# Patient Record
Sex: FEMALE | Race: WHITE | NOT HISPANIC OR LATINO | Employment: FULL TIME | URBAN - METROPOLITAN AREA
[De-identification: names, ages, dates, MRNs, and addresses within clinical notes are randomized per-mention and may not be internally consistent; named-entity substitution may affect disease eponyms.]

---

## 2019-03-14 ENCOUNTER — OFFICE VISIT (OUTPATIENT)
Dept: FAMILY MEDICINE CLINIC | Facility: CLINIC | Age: 34
End: 2019-03-14

## 2019-03-14 VITALS
WEIGHT: 194 LBS | HEART RATE: 68 BPM | HEIGHT: 66 IN | DIASTOLIC BLOOD PRESSURE: 84 MMHG | OXYGEN SATURATION: 99 % | SYSTOLIC BLOOD PRESSURE: 120 MMHG | BODY MASS INDEX: 31.18 KG/M2

## 2019-03-14 DIAGNOSIS — M25.552 PAIN OF LEFT HIP JOINT: ICD-10-CM

## 2019-03-14 DIAGNOSIS — Z12.39 SCREENING FOR BREAST CANCER: ICD-10-CM

## 2019-03-14 DIAGNOSIS — Z13.1 SCREENING FOR DIABETES MELLITUS: ICD-10-CM

## 2019-03-14 DIAGNOSIS — Z13.0 SCREENING FOR DEFICIENCY ANEMIA: ICD-10-CM

## 2019-03-14 DIAGNOSIS — Z80.3 FAMILY HISTORY OF BREAST CANCER: ICD-10-CM

## 2019-03-14 DIAGNOSIS — Z00.00 ANNUAL PHYSICAL EXAM: Primary | ICD-10-CM

## 2019-03-14 DIAGNOSIS — Z13.220 SCREENING FOR HYPERLIPIDEMIA: ICD-10-CM

## 2019-03-14 DIAGNOSIS — Z23 NEED FOR VACCINATION: ICD-10-CM

## 2019-03-14 DIAGNOSIS — R53.82 CHRONIC FATIGUE: ICD-10-CM

## 2019-03-14 PROCEDURE — 90732 PPSV23 VACC 2 YRS+ SUBQ/IM: CPT | Performed by: FAMILY MEDICINE

## 2019-03-14 PROCEDURE — 90715 TDAP VACCINE 7 YRS/> IM: CPT | Performed by: FAMILY MEDICINE

## 2019-03-14 PROCEDURE — 99385 PREV VISIT NEW AGE 18-39: CPT | Performed by: FAMILY MEDICINE

## 2019-03-14 PROCEDURE — 90471 IMMUNIZATION ADMIN: CPT | Performed by: FAMILY MEDICINE

## 2019-03-14 PROCEDURE — 90472 IMMUNIZATION ADMIN EACH ADD: CPT | Performed by: FAMILY MEDICINE

## 2019-03-14 RX ORDER — ALBUTEROL SULFATE 90 UG/1
2 AEROSOL, METERED RESPIRATORY (INHALATION) EVERY 4 HOURS PRN
COMMUNITY
End: 2020-01-06 | Stop reason: SDUPTHER

## 2019-03-14 NOTE — PROGRESS NOTES
Subjective   Olga Szymanski is a 33 y.o. female. Patient is here today for   Chief Complaint   Patient presents with   • Establish Care   • Annual Exam     Physical          HPI    She is not fasting but she is able to return in a few weeks for labs.       Health Habits:  Dental Exam. up to date  Eye Exam. not up to date - Will schedule eye exam  Exercise: 5 times/week.  Current exercise activities include: running/ jogging  Wear seatbelt:Yes  Smoke detectors in the home:  Yes  Carbon monoxide detectors in the home: Yes  Guns in the home: Yes- Stored in a safe.  Feel safe with spouse/in the home: Yes  Mental Health concerns: pt endorses a hx of mild but chronic anxiety and depression. She plans to meet with a counselor available through her employer. She prefers to avoid Rx medications if possible.    STI screening: Sexually active with ; She declines STI screening today.   Vaccinations: She would like a TDAP booster and Pneumovax today.   Cancer Screening: Her PAP smear is UTD with her OB/GYN. She would like to get a baseline mammogram. She has a family hx of breast cancer/ovarian cancer in her maternal grandmother and breast cancer/colon cancer in her paternal grandmother.  She believes her grandmother developed breast cancer around age 45.  No BRBPR.   Sunscreen:No- She will schedule an appointment with her dermatologist.       Patient reports a hx of asthma. She experiences some flare-ups with running. She has an albuterol inhaler but does not use it because she does not like the taste.     Left sided leg pain:  Patient reports intermittent left posterior leg pain and left hip pain after exertion. She notes that she sleeps on her left side every night. No known injury or fall.       The following portions of the patient's history were reviewed and updated as appropriate: allergies, current medications, past family history, past medical history, past social history, past surgical history and problem  list.    Past Medical History:   Diagnosis Date   • Allergic    • Asthma       History reviewed. No pertinent surgical history.    Family History   Problem Relation Age of Onset   • Colon polyps Father    • Anxiety disorder Brother    • Depression Brother    • Ovarian cancer Maternal Grandmother    • Breast cancer Maternal Grandmother    • Heart disease Maternal Grandfather    • Breast cancer Paternal Grandmother    • Colon cancer Paternal Grandmother    • Alzheimer's disease Paternal Grandfather        No Known Allergies     Social History     Tobacco Use   • Smoking status: Current Some Day Smoker     Types: Cigarettes   • Smokeless tobacco: Never Used   • Tobacco comment: maybe 0.5 pack per week   Substance and Sexual Activity   • Alcohol use: Yes     Comment: about 6 drinks per week    • Drug use: No   • Sexual activity: Yes     Partners: Male     Birth control/protection: None     Comment: trying to conceive           Social History Narrative    Lives at home with her  and their dog.  Works as a  at [employer].        Outpatient Medications Prior to Visit   Medication Sig Dispense Refill   • albuterol sulfate  (90 Base) MCG/ACT inhaler Inhale 2 puffs Every 4 (Four) Hours As Needed.       Review of Systems   Constitutional: Positive for fatigue. Negative for activity change, appetite change and unexpected weight change.   HENT: Negative for congestion, ear pain, sinus pain and tinnitus.    Eyes: Negative for pain and visual disturbance.   Respiratory: Negative for cough, chest tightness, shortness of breath and wheezing.    Cardiovascular: Negative for chest pain and palpitations.   Gastrointestinal: Negative for abdominal pain, constipation, diarrhea, nausea and vomiting.   Genitourinary: Negative for dysuria, hematuria and menstrual problem.   Musculoskeletal: Positive for arthralgias and myalgias. Negative for back pain and gait problem.        Left hip and left leg pain   Skin:  Negative for rash and wound.   Allergic/Immunologic: Positive for environmental allergies. Negative for food allergies.   Neurological: Negative for dizziness, weakness, numbness and headaches.   Psychiatric/Behavioral: Positive for sleep disturbance. Negative for dysphoric mood. The patient is not nervous/anxious.          Objective       Vitals:    03/14/19 0903   BP: 120/84   Pulse: 68   SpO2: 99%       Physical Exam   Constitutional: She appears well-developed and well-nourished.   HENT:   Head: Normocephalic and atraumatic.   Right Ear: Tympanic membrane and ear canal normal.   Left Ear: Tympanic membrane and ear canal normal.   Mouth/Throat: Oropharynx is clear and moist.   Eyes: EOM are normal. Pupils are equal, round, and reactive to light.   Neck: Normal range of motion. Neck supple. No thyromegaly present.   Cardiovascular: Normal rate and regular rhythm. Exam reveals no gallop and no friction rub.   No murmur heard.  Pulmonary/Chest: Effort normal and breath sounds normal. No respiratory distress. She has no wheezes. She has no rales.   Abdominal: Bowel sounds are normal. She exhibits no distension. There is no hepatosplenomegaly. There is no tenderness. There is no rigidity, no rebound and no guarding.   Musculoskeletal: Normal range of motion.        Right hip: Normal. She exhibits normal range of motion and no tenderness.        Left hip: Normal. She exhibits normal range of motion, normal strength, no tenderness, no bony tenderness, no crepitus and no deformity.   No pain with internal or external rotation bilaterally.    Lymphadenopathy:     She has no cervical adenopathy.   Neurological: She has normal strength. Gait normal.   Skin: Skin is warm and dry.   Psychiatric: Her speech is normal. Her mood appears anxious.   Vitals reviewed.      ASSESSMENT/PLAN             Visit Diagnoses     Annual physical exam    -  Primary    Relevant Orders    Mammo Screening Bilateral With CAD, plan for baseline  imaging now, at age 33, since pt's grandmother developed breast cancer around age 45    Comprehensive metabolic panel (Completed)    Hemoglobin A1c (Completed)    Lipid panel (Completed)    CBC No Differential (Completed)    TSH (Completed)    Tdap Vaccine Greater Than or Equal To 8yo IM (Completed)    Pneumococcal Polysaccharide Vaccine 23-Valent (PPSV23) Greater Than or Equal To 1yo Subcutaneous / IM (Completed)      Screening for diabetes mellitus        Relevant Orders    Comprehensive metabolic panel (Completed)    Hemoglobin A1c (Completed)    Screening for hyperlipidemia        Relevant Orders    Lipid panel (Completed)    Screening for deficiency anemia        Relevant Orders    CBC No Differential (Completed)    Need for vaccination        Relevant Orders    Tdap Vaccine Greater Than or Equal To 8yo IM (Completed)    Pneumococcal Polysaccharide Vaccine 23-Valent (PPSV23) Greater Than or Equal To 1yo Subcutaneous / IM (Completed)      Screening for breast cancer        Relevant Orders    Mammo Screening Bilateral With CAD    Family history of breast cancer        Relevant Orders    Mammo Screening Bilateral With CAD    Chronic fatigue        Relevant Orders    Comprehensive metabolic panel (Completed)    Hemoglobin A1c (Completed)    CBC No Differential (Completed)  Pt briefly counseled on sleep hygiene.        Pain of left hip joint      Normal physical exam  Pt declines referral to PT at this time  Pt advised to try OTC Tylenol prn and stretching            Patient Instructions   Schedule a nurse visit for fasting labs. Our lab is open Mon-Fri from 8am-4:30pm.     Please schedule an eye exam and follow up with your dermatologist soon.      some sleepy time tea with chamomile to help you sleep.     Insomnia  Insomnia is a sleep disorder that makes it difficult to fall asleep or to stay asleep. Insomnia can cause tiredness (fatigue), low energy, difficulty concentrating, mood swings, and poor  performance at work or school.  There are three different ways to classify insomnia:  · Difficulty falling asleep.  · Difficulty staying asleep.  · Waking up too early in the morning.    Any type of insomnia can be long-term (chronic) or short-term (acute). Both are common. Short-term insomnia usually lasts for three months or less. Chronic insomnia occurs at least three times a week for longer than three months.  What are the causes?  Insomnia may be caused by another condition, situation, or substance, such as:  · Anxiety.  · Certain medicines.  · Gastroesophageal reflux disease (GERD) or other gastrointestinal conditions.  · Asthma or other breathing conditions.  · Restless legs syndrome, sleep apnea, or other sleep disorders.  · Chronic pain.  · Menopause. This may include hot flashes.  · Stroke.  · Abuse of alcohol, tobacco, or illegal drugs.  · Depression.  · Caffeine.  · Neurological disorders, such as Alzheimer disease.  · An overactive thyroid (hyperthyroidism).    The cause of insomnia may not be known.  What increases the risk?  Risk factors for insomnia include:  · Gender. Women are more commonly affected than men.  · Age. Insomnia is more common as you get older.  · Stress. This may involve your professional or personal life.  · Income. Insomnia is more common in people with lower income.  · Lack of exercise.  · Irregular work schedule or night shifts.  · Traveling between different time zones.    What are the signs or symptoms?  If you have insomnia, trouble falling asleep or trouble staying asleep is the main symptom. This may lead to other symptoms, such as:  · Feeling fatigued.  · Feeling nervous about going to sleep.  · Not feeling rested in the morning.  · Having trouble concentrating.  · Feeling irritable, anxious, or depressed.    How is this treated?  Treatment for insomnia depends on the cause. If your insomnia is caused by an underlying condition, treatment will focus on addressing the  condition. Treatment may also include:  · Medicines to help you sleep.  · Counseling or therapy.  · Lifestyle adjustments.    Follow these instructions at home:  · Take medicines only as directed by your health care provider.  · Keep regular sleeping and waking hours. Avoid naps.  · Keep a sleep diary to help you and your health care provider figure out what could be causing your insomnia. Include:  ? When you sleep.  ? When you wake up during the night.  ? How well you sleep.  ? How rested you feel the next day.  ? Any side effects of medicines you are taking.  ? What you eat and drink.  · Make your bedroom a comfortable place where it is easy to fall asleep:  ? Put up shades or special blackout curtains to block light from outside.  ? Use a white noise machine to block noise.  ? Keep the temperature cool.  · Exercise regularly as directed by your health care provider. Avoid exercising right before bedtime.  · Use relaxation techniques to manage stress. Ask your health care provider to suggest some techniques that may work well for you. These may include:  ? Breathing exercises.  ? Routines to release muscle tension.  ? Visualizing peaceful scenes.  · Cut back on alcohol, caffeinated beverages, and cigarettes, especially close to bedtime. These can disrupt your sleep.  · Do not overeat or eat spicy foods right before bedtime. This can lead to digestive discomfort that can make it hard for you to sleep.  · Limit screen use before bedtime. This includes:  ? Watching TV.  ? Using your smartphone, tablet, and computer.  · Stick to a routine. This can help you fall asleep faster. Try to do a quiet activity, brush your teeth, and go to bed at the same time each night.  · Get out of bed if you are still awake after 15 minutes of trying to sleep. Keep the lights down, but try reading or doing a quiet activity. When you feel sleepy, go back to bed.  · Make sure that you drive carefully. Avoid driving if you feel very  sleepy.  · Keep all follow-up appointments as directed by your health care provider. This is important.  Contact a health care provider if:  · You are tired throughout the day or have trouble in your daily routine due to sleepiness.  · You continue to have sleep problems or your sleep problems get worse.  Get help right away if:  · You have serious thoughts about hurting yourself or someone else.  This information is not intended to replace advice given to you by your health care provider. Make sure you discuss any questions you have with your health care provider.  Document Released: 12/15/2001 Document Revised: 05/19/2017 Document Reviewed: 09/18/2015  [employer] Interactive Patient Education © 2018 [employer]        Return in about 1 year (around 3/14/2020).    I,Alexandra Farley MD, am scribing for, and in the presence of,Alexandra Farley MD. 3/14/2019 11:19 AM    I, Alexandra Farley MD   personally, performed the services described in this documentation, as scribed by,Alexandra Farley MD, in my presence, and it is both accurate and complete.    Alexandra Farley MD   03/31/19  4:37 PM

## 2019-03-14 NOTE — PATIENT INSTRUCTIONS
Schedule a nurse visit for fasting labs. Our lab is open Mon-Fri from 8am-4:30pm.     Please schedule an eye exam and follow up with your dermatologist soon.      some sleepy time tea with chamomile to help you sleep.     Insomnia  Insomnia is a sleep disorder that makes it difficult to fall asleep or to stay asleep. Insomnia can cause tiredness (fatigue), low energy, difficulty concentrating, mood swings, and poor performance at work or school.  There are three different ways to classify insomnia:  · Difficulty falling asleep.  · Difficulty staying asleep.  · Waking up too early in the morning.    Any type of insomnia can be long-term (chronic) or short-term (acute). Both are common. Short-term insomnia usually lasts for three months or less. Chronic insomnia occurs at least three times a week for longer than three months.  What are the causes?  Insomnia may be caused by another condition, situation, or substance, such as:  · Anxiety.  · Certain medicines.  · Gastroesophageal reflux disease (GERD) or other gastrointestinal conditions.  · Asthma or other breathing conditions.  · Restless legs syndrome, sleep apnea, or other sleep disorders.  · Chronic pain.  · Menopause. This may include hot flashes.  · Stroke.  · Abuse of alcohol, tobacco, or illegal drugs.  · Depression.  · Caffeine.  · Neurological disorders, such as Alzheimer disease.  · An overactive thyroid (hyperthyroidism).    The cause of insomnia may not be known.  What increases the risk?  Risk factors for insomnia include:  · Gender. Women are more commonly affected than men.  · Age. Insomnia is more common as you get older.  · Stress. This may involve your professional or personal life.  · Income. Insomnia is more common in people with lower income.  · Lack of exercise.  · Irregular work schedule or night shifts.  · Traveling between different time zones.    What are the signs or symptoms?  If you have insomnia, trouble falling asleep or trouble  staying asleep is the main symptom. This may lead to other symptoms, such as:  · Feeling fatigued.  · Feeling nervous about going to sleep.  · Not feeling rested in the morning.  · Having trouble concentrating.  · Feeling irritable, anxious, or depressed.    How is this treated?  Treatment for insomnia depends on the cause. If your insomnia is caused by an underlying condition, treatment will focus on addressing the condition. Treatment may also include:  · Medicines to help you sleep.  · Counseling or therapy.  · Lifestyle adjustments.    Follow these instructions at home:  · Take medicines only as directed by your health care provider.  · Keep regular sleeping and waking hours. Avoid naps.  · Keep a sleep diary to help you and your health care provider figure out what could be causing your insomnia. Include:  ? When you sleep.  ? When you wake up during the night.  ? How well you sleep.  ? How rested you feel the next day.  ? Any side effects of medicines you are taking.  ? What you eat and drink.  · Make your bedroom a comfortable place where it is easy to fall asleep:  ? Put up shades or special blackout curtains to block light from outside.  ? Use a white noise machine to block noise.  ? Keep the temperature cool.  · Exercise regularly as directed by your health care provider. Avoid exercising right before bedtime.  · Use relaxation techniques to manage stress. Ask your health care provider to suggest some techniques that may work well for you. These may include:  ? Breathing exercises.  ? Routines to release muscle tension.  ? Visualizing peaceful scenes.  · Cut back on alcohol, caffeinated beverages, and cigarettes, especially close to bedtime. These can disrupt your sleep.  · Do not overeat or eat spicy foods right before bedtime. This can lead to digestive discomfort that can make it hard for you to sleep.  · Limit screen use before bedtime. This includes:  ? Watching TV.  ? Using your smartphone, tablet,  and computer.  · Stick to a routine. This can help you fall asleep faster. Try to do a quiet activity, brush your teeth, and go to bed at the same time each night.  · Get out of bed if you are still awake after 15 minutes of trying to sleep. Keep the lights down, but try reading or doing a quiet activity. When you feel sleepy, go back to bed.  · Make sure that you drive carefully. Avoid driving if you feel very sleepy.  · Keep all follow-up appointments as directed by your health care provider. This is important.  Contact a health care provider if:  · You are tired throughout the day or have trouble in your daily routine due to sleepiness.  · You continue to have sleep problems or your sleep problems get worse.  Get help right away if:  · You have serious thoughts about hurting yourself or someone else.  This information is not intended to replace advice given to you by your health care provider. Make sure you discuss any questions you have with your health care provider.  Document Released: 12/15/2001 Document Revised: 05/19/2017 Document Reviewed: 09/18/2015  [employer] Interactive Patient Education © 2018 [employer]

## 2019-03-15 ENCOUNTER — TRANSCRIBE ORDERS (OUTPATIENT)
Dept: ADMINISTRATIVE | Facility: HOSPITAL | Age: 34
End: 2019-03-15

## 2019-03-15 DIAGNOSIS — Z12.31 VISIT FOR SCREENING MAMMOGRAM: Primary | ICD-10-CM

## 2019-03-21 ENCOUNTER — LAB (OUTPATIENT)
Dept: FAMILY MEDICINE CLINIC | Facility: CLINIC | Age: 34
End: 2019-03-21

## 2019-03-21 DIAGNOSIS — Z13.0 SCREENING FOR DEFICIENCY ANEMIA: ICD-10-CM

## 2019-03-21 DIAGNOSIS — Z13.1 SCREENING FOR DIABETES MELLITUS: ICD-10-CM

## 2019-03-21 DIAGNOSIS — R53.82 CHRONIC FATIGUE: ICD-10-CM

## 2019-03-21 DIAGNOSIS — Z00.00 ANNUAL PHYSICAL EXAM: ICD-10-CM

## 2019-03-21 DIAGNOSIS — Z13.220 SCREENING FOR HYPERLIPIDEMIA: ICD-10-CM

## 2019-03-22 LAB
ALBUMIN SERPL-MCNC: 4.5 G/DL (ref 3.5–5.2)
ALBUMIN/GLOB SERPL: 1.7 G/DL
ALP SERPL-CCNC: 45 U/L (ref 39–117)
ALT SERPL-CCNC: 17 U/L (ref 1–33)
AST SERPL-CCNC: 17 U/L (ref 1–32)
BILIRUB SERPL-MCNC: 0.6 MG/DL (ref 0.2–1.2)
BUN SERPL-MCNC: 11 MG/DL (ref 6–20)
BUN/CREAT SERPL: 13.4 (ref 7–25)
CALCIUM SERPL-MCNC: 9.5 MG/DL (ref 8.6–10.5)
CHLORIDE SERPL-SCNC: 99 MMOL/L (ref 98–107)
CHOLEST SERPL-MCNC: 161 MG/DL (ref 0–200)
CO2 SERPL-SCNC: 21.2 MMOL/L (ref 22–29)
CREAT SERPL-MCNC: 0.82 MG/DL (ref 0.57–1)
ERYTHROCYTE [DISTWIDTH] IN BLOOD BY AUTOMATED COUNT: 11.7 % (ref 12.3–15.4)
GLOBULIN SER CALC-MCNC: 2.6 GM/DL
GLUCOSE SERPL-MCNC: 84 MG/DL (ref 65–99)
HBA1C MFR BLD: 5.03 % (ref 4.8–5.6)
HCT VFR BLD AUTO: 48.5 % (ref 34–46.6)
HDLC SERPL-MCNC: 63 MG/DL (ref 40–60)
HGB BLD-MCNC: 15.7 G/DL (ref 12–15.9)
LDLC SERPL CALC-MCNC: 81 MG/DL (ref 0–100)
MCH RBC QN AUTO: 32.5 PG (ref 26.6–33)
MCHC RBC AUTO-ENTMCNC: 32.4 G/DL (ref 31.5–35.7)
MCV RBC AUTO: 100.4 FL (ref 79–97)
PLATELET # BLD AUTO: 292 10*3/MM3 (ref 140–450)
POTASSIUM SERPL-SCNC: 4.4 MMOL/L (ref 3.5–5.2)
PROT SERPL-MCNC: 7.1 G/DL (ref 6–8.5)
RBC # BLD AUTO: 4.83 10*6/MM3 (ref 3.77–5.28)
SODIUM SERPL-SCNC: 137 MMOL/L (ref 136–145)
TRIGL SERPL-MCNC: 87 MG/DL (ref 0–150)
TSH SERPL DL<=0.005 MIU/L-ACNC: 2.51 MIU/ML (ref 0.27–4.2)
VLDLC SERPL CALC-MCNC: 17.4 MG/DL (ref 5–40)
WBC # BLD AUTO: 5.5 10*3/MM3 (ref 3.4–10.8)

## 2019-03-28 DIAGNOSIS — D75.89 MACROCYTOSIS: Primary | ICD-10-CM

## 2019-03-29 LAB
FOLATE SERPL-MCNC: 9.64 NG/ML (ref 4.78–24.2)
VIT B12 SERPL-MCNC: 472 PG/ML (ref 211–946)

## 2019-04-12 ENCOUNTER — APPOINTMENT (OUTPATIENT)
Dept: MAMMOGRAPHY | Facility: HOSPITAL | Age: 34
End: 2019-04-12

## 2019-05-07 ENCOUNTER — HOSPITAL ENCOUNTER (OUTPATIENT)
Dept: MAMMOGRAPHY | Facility: HOSPITAL | Age: 34
Discharge: HOME | End: 2019-05-07
Admitting: FAMILY MEDICINE

## 2019-05-07 DIAGNOSIS — Z12.31 VISIT FOR SCREENING MAMMOGRAM: ICD-10-CM

## 2019-05-07 PROCEDURE — 77063 BREAST TOMOSYNTHESIS BI: CPT

## 2019-05-07 PROCEDURE — 77067 SCR MAMMO BI INCL CAD: CPT

## 2019-09-11 ENCOUNTER — OFFICE VISIT (OUTPATIENT)
Dept: FAMILY MEDICINE CLINIC | Facility: CLINIC | Age: 34
End: 2019-09-11

## 2019-09-11 VITALS
DIASTOLIC BLOOD PRESSURE: 74 MMHG | WEIGHT: 191 LBS | BODY MASS INDEX: 30.7 KG/M2 | RESPIRATION RATE: 16 BRPM | HEART RATE: 66 BPM | OXYGEN SATURATION: 99 % | SYSTOLIC BLOOD PRESSURE: 116 MMHG | HEIGHT: 66 IN

## 2019-09-11 DIAGNOSIS — R07.9 CHEST PAIN, UNSPECIFIED TYPE: ICD-10-CM

## 2019-09-11 DIAGNOSIS — M25.552 LEFT HIP PAIN: Primary | ICD-10-CM

## 2019-09-11 PROCEDURE — 99214 OFFICE O/P EST MOD 30 MIN: CPT | Performed by: NURSE PRACTITIONER

## 2019-09-11 PROCEDURE — 93000 ELECTROCARDIOGRAM COMPLETE: CPT | Performed by: NURSE PRACTITIONER

## 2019-09-11 RX ORDER — MULTIPLE VITAMINS W/ MINERALS TAB 9MG-400MCG
1 TAB ORAL DAILY
COMMUNITY
End: 2021-11-09

## 2019-09-11 NOTE — PROGRESS NOTES
Subjective   Olga Szymanski is a 33 y.o. female.     History of Present Illness   Pt is here for chest, left upper abdominal quadrant and L middle back pain . Pt states Sx have been present for 6 months.They are intermittent and the pain is stabbing it can last up to 2 hrs  Has not taken any otc meds for pain.     Has started a boot camp and thinks it is a muscle strain                              The following portions of the patient's history were reviewed and updated as appropriate: allergies, current medications, past family history, past medical history, past social history, past surgical history and problem list.    Review of Systems   Constitutional: Negative for activity change and appetite change.   Respiratory: Negative for cough and shortness of breath.    Cardiovascular: Positive for chest pain. Negative for palpitations and leg swelling.   Gastrointestinal: Positive for abdominal pain. Negative for blood in stool, constipation and diarrhea.   Neurological: Negative for dizziness and headaches.       Objective   Physical Exam   Constitutional: She appears well-developed and well-nourished. No distress.   HENT:   Head: Normocephalic and atraumatic.   Right Ear: External ear normal.   Left Ear: External ear normal.   Eyes: EOM are normal.   Neck: Neck supple. No thyromegaly present.   Cardiovascular: Normal rate, regular rhythm and normal heart sounds.   Pulmonary/Chest: Effort normal and breath sounds normal.   Musculoskeletal: Normal range of motion.   Neurological: She is alert.   Skin: Skin is warm.   Nursing note and vitals reviewed.    ECG 12 Lead  Date/Time: 9/12/2019 9:45 AM  Performed by: Azra Moraes APRN  Authorized by: Azra Moraes APRN   Comparison: not compared with previous ECG   Rhythm: sinus rhythm and sinus tachycardia  Rate: bradycardic  Conduction: conduction normal  QRS axis: normal  Other: no other findings    Clinical impression: normal ECG            Assessment/Plan    Olga was seen today for chest pain and abdominal pain.    Diagnoses and all orders for this visit:    Left hip pain  -     Ambulatory Referral to Physical Therapy Evaluate and treat  -     ECG 12 Lead    Chest pain, unspecified type  -     ECG 12 Lead    Other orders  -     ECG 12 Lead

## 2019-09-11 NOTE — PATIENT INSTRUCTIONS
Hip Pain    The hip is the joint between the upper legs and the lower pelvis. The bones, cartilage, tendons, and muscles of your hip joint support your body and allow you to move around.  Hip pain can range from a minor ache to severe pain in one or both of your hips. The pain may be felt on the inside of the hip joint near the groin, or the outside near the buttocks and upper thigh. You may also have swelling or stiffness.  Follow these instructions at home:  Managing pain, stiffness, and swelling  · If directed, apply ice to the injured area.  ? Put ice in a plastic bag.  ? Place a towel between your skin and the bag.  ? Leave the ice on for 20 minutes, 2-3 times a day  · Sleep with a pillow between your legs on your most comfortable side.  · Avoid any activities that cause pain.  General instructions  · Take over-the-counter and prescription medicines only as told by your health care provider.  · Do any exercises as told by your health care provider.  · Record the following:  ? How often you have hip pain.  ? The location of your pain.  ? What the pain feels like.  ? What makes the pain worse.  · Keep all follow-up visits as told by your health care provider. This is important.  Contact a health care provider if:  · You cannot put weight on your leg.  · Your pain or swelling continues or gets worse after one week.  · It gets harder to walk.  · You have a fever.  Get help right away if:  · You fall.  · You have a sudden increase in pain and swelling in your hip.  · Your hip is red or swollen or very tender to touch.  Summary  · Hip pain can range from a minor ache to severe pain in one or both of your hips.  · The pain may be felt on the inside of the hip joint near the groin, or the outside near the buttocks and upper thigh.  · Avoid any activities that cause pain.  · Record how often you have hip pain, the location of the pain, what makes it worse and what it feels like.  This information is not intended to  replace advice given to you by your health care provider. Make sure you discuss any questions you have with your health care provider.  Document Released: 06/07/2011 Document Revised: 11/20/2017 Document Reviewed: 11/20/2017  [employer] Interactive Patient Education © 2019 [employer]

## 2019-10-01 ENCOUNTER — HOSPITAL ENCOUNTER (OUTPATIENT)
Dept: PHYSICAL THERAPY | Facility: HOSPITAL | Age: 34
Setting detail: THERAPIES SERIES
Discharge: HOME | End: 2019-10-01

## 2019-10-01 DIAGNOSIS — M25.552 LEFT HIP PAIN: Primary | ICD-10-CM

## 2019-10-01 DIAGNOSIS — R29.898 LEFT LEG WEAKNESS: ICD-10-CM

## 2019-10-01 PROCEDURE — 97110 THERAPEUTIC EXERCISES: CPT

## 2019-10-01 PROCEDURE — 97161 PT EVAL LOW COMPLEX 20 MIN: CPT

## 2019-10-01 NOTE — THERAPY EVALUATION
Outpatient Physical Therapy Ortho Initial Evaluation  [facility]     Patient Name: Olga Szymanski  : 1985  MRN: [id number]  Today's Date: 10/1/2019      Visit Date: 10/01/2019    Patient Active Problem List   Diagnosis   • Allergic   • Asthma        Past Medical History:   Diagnosis Date   • Allergic    • Asthma    • Depression    • HPV in female 2006    followed by Gynecology        History reviewed. No pertinent surgical history.    Visit Dx:     ICD-10-CM ICD-9-CM   1. Left hip pain M25.552 719.45   2. Left leg weakness R29.898 729.89         Patient History     Row Name 10/01/19 1400             History    Brief Description of Current Complaint  Pt reports history of L hip pain ongoing x3 years which has been worsening. Pt has never had treatment for this previously and denies imaging to date. Pt established care with PCP due to new onset of burning/pain in L shoudler with radiation to chest. Had EKG done and per PCP likely pulled muscle vs cardiac issue. Pt participates in bootcamp 4-5x/week and does not feel irritation with any particular exercise. Pt suspects sciatica and gets sharp pain in heel with certain movements. Pt reports stiffness with prolonged position as well as difficulty sleeping on the L side.   -CN      Patient/Caregiver Goals  Relieve pain;Return to prior level of function;Know what to do to help the symptoms  -CN      Hand Dominance  left-handed  -CN         Pain     Pain Location  Hip  -CN      Pain at Present  1  -CN      Pain at Best  0  -CN      Pain at Worst  5  -CN      Pain Frequency  Intermittent  -CN      Pain Description  Shooting  -CN      What Performance Factors Make the Current Problem(s) WORSE?  sleeping on L side, sitting for extended period of time  -CN      What Performance Factors Make the Current Problem(s) BETTER?  Stretching, working out, Advil  -CN      Is your sleep disturbed?  No  -CN      Difficulties at work?  Yes, either standing/walking most  of the day or sitting most of the day; stiffness with both  -CN      Difficulties with recreational activities?  Yes, bootcamp 4-5x/week  -CN         Fall Risk Assessment    Any falls in the past year:  No  -CN         Services    Are you currently receiving Home Health services  No  -CN         Daily Activities    Primary Language  English  -CN      Are you able to read  Yes  -CN      Are you able to write  Yes  -CN      How does patient learn best?  Listening;Reading  -CN      Barriers to learning  None  -CN      Pt Participated in POC and Goals  Yes  -CN         Safety    Are you being hurt, hit, or frightened by anyone at home or in your life?  No  -CN      Are you being neglected by a caregiver  No  -CN        User Key  (r) = Recorded By, (t) = Taken By, (c) = Cosigned By    Initials Name Provider Type    CN Bela Khoury, PT Physical Therapist          PT Ortho     Row Name 10/01/19 1400       Posture/Observations    Alignment Options  Iliac crests  -CN    Iliac crests  Right:;Mild;Elevated  -CN       Sensory Screen for Light Touch- Lower Quarter Clearing    L1 (inguinal area)  Bilateral:;Intact  -CN    L2 (anterior mid thigh)  Bilateral:;Intact  -CN    L3 (distal anterior thigh)  Bilateral:;Intact  -CN    L4 (medial lower leg/foot)  Bilateral:;Intact  -CN    L5 (lateral lower leg/great toe)  Right:;Intact;Left:;Diminished  -CN       Myotomal Screen- Lower Quarter Clearing    Hip flexion (L2)  Right:;4 (Good);Left:;4- (Good -)  -CN    Knee extension (L3)  Right:;4+ (Good +);Left:;4 (Good)  -CN    Ankle DF (L4)  Right:;4+ (Good +);Left:;4 (Good)  -CN    Ankle PF (S1)  Bilateral:;4+ (Good +)  -CN    Knee flexion (S2)  Right:;4+ (Good +);Left:;4- (Good -)  -CN       Lumbar ROM Screen- Lower Quarter Clearing    Lumbar Flexion  Normal  -CN    Lumbar Extension  Normal  -CN    Lumbar Lateral Flexion  Normal  -CN    Lumbar Rotation  Normal  -CN       Hip/Thigh Palpation    Piriformis   Left:;Tender;Guarded/taut;Trigger point  -CN       Hip Special Tests    JANIE (hip vs SI pathology)  Left:;Negative  -CN       MMT (Manual Muscle Testing)    General MMT Comments  Hip abduction: R=4/5, L=4-/5; Hip extension: B=4-/5  -CN       Lower Extremity Flexibility    Hamstrings  Bilateral:;Mildly limited  -CN      User Key  (r) = Recorded By, (t) = Taken By, (c) = Cosigned By    Initials Name Provider Type    eBla Barbosa, PT Physical Therapist                      Therapy Education  Education Details: Anatomy, activity modifications, goals of PT, eval findings  Given: HEP, Symptoms/condition management, Pain management, Posture/body mechanics  Program: New  How Provided: Verbal, Demonstration, Written  Provided to: Patient  Level of Understanding: Teach back education performed, Verbalized, Demonstrated     PT OP Goals     Row Name 10/01/19 1500          PT Short Term Goals    STG Date to Achieve  10/22/19  -CN     STG 1  Pt will report pain rated 2/10 at worst in order to demonstrate ability to return to normalized ADLs and functional activities.  -CN     STG 1 Progress  New  -CN     STG 2  Pt will be independent with initial HEP for symptom management.  -CN     STG 2 Progress  New  -CN        Long Term Goals    LTG Date to Achieve  11/12/19  -CN     LTG 1  Pt will be independent and compliant with advanced HEP for long term management of symptoms and prevention of future occurrence.   -CN     LTG 1 Progress  New  -CN     LTG 2  Pt will be able to demonstrate hip abd and extension strength of 4/5 B in order to improve stability with recreational activities.   -CN     LTG 2 Progress  New  -CN     LTG 3  Pt will be able to return to bootcamp activities with minimal reports of pain and good form in order to decrease changes of future recurrence of symptoms.   -CN     LTG 3 Progress  New  -CN        Time Calculation    PT Goal Re-Cert Due Date  11/01/19  -CN       User Key  (r) = Recorded By, (t) =  Taken By, (c) = Cosigned By    Initials Name Provider Type    CN Bela Khoury, PT Physical Therapist          PT Assessment/Plan     Row Name 10/01/19 1976          PT Assessment    Functional Limitations  Limitations in functional capacity and performance;Performance in leisure activities;Performance in work activities  -CN     Impairments  Impaired flexibility;Muscle strength;Sensation;Pain;Posture  -CN     Assessment Comments  33 y.o. female referred to outpatient physical therapy for evaluation and treatment of left hip pain ongoing for several years.  Patient presents with tenderness to palpation in piriformis and QL, decreased hip abd/ext strength, dec core stability, dermatomal deficits in L L5, dec HS strength and impaired HS flexibility. Pt's signs and symptoms are consistent with piriformis syndrome and core/hip instability.  Pertinent comorbidities and personal factors that may affect progress include, but are not limited to, L shoulder pain which is worsening as hip progresses.  Pt scored 75/80 on the LEFS where 80/80 is no disability and is in stable clinical condition. Pt would benefit from skilled PT to address functional deficits and return to PLOF.   -CN     Please refer to paper survey for additional self-reported information  Yes  -CN     Rehab Potential  Good  -CN     Patient/caregiver participated in establishment of treatment plan and goals  Yes  -CN     Patient would benefit from skilled therapy intervention  Yes  -CN        PT Plan    PT Frequency  2x/week  -CN     Predicted Duration of Therapy Intervention (Therapy Eval)  6 weeks  -CN     Planned CPT's?  PT EVAL LOW COMPLEXITY: 95220;PT RE-EVAL: 77968;PT THER PROC EA 15 MIN: 96848;PT THER ACT EA 15 MIN: 30804;PT MANUAL THERAPY EA 15 MIN: 26448;PT NEUROMUSC RE-EDUCATION EA 15 MIN: 40635;PT HOT OR COLD PACK TREAT MCARE;PT ELECTRICAL STIM UNATTEND: ;PT ULTRASOUND EA 15 MIN: 98330;PT TRACTION LUMBAR: 24490  -CN     PT Plan  Comments  PT to treat 2x/week for 6 weeks consisting of flexibility, core/hip strengthening and stability exercises. Consider recumbent bike warm up, stm to L hip tissues, SL clamshells (if painfree), glute sets with progression to bridges, prone hip extension, resisted sidesteping and quadruped activites next visit.   -CN       User Key  (r) = Recorded By, (t) = Taken By, (c) = Cosigned By    Initials Name Provider Type    Bela Barbosa, MARIPOSA Physical Therapist            OP Exercises     Row Name 10/01/19 1500             Exercise 1    Exercise Name 1  Piriformis stretch (reach through)  -CN      Cueing 1  Verbal  -CN      Reps 1  3  -CN      Time 1  20 sec  -CN         Exercise 2    Exercise Name 2  90/90 HS stretch with ankle pump  -CN      Cueing 2  Verbal;Demo  -CN      Reps 2  10  -CN      Time 2  10 pumps  -CN         Exercise 3    Exercise Name 3  LTR  -CN      Cueing 3  Verbal  -CN      Reps 3  10  -CN      Time 3  5 sec  -CN         Exercise 4    Exercise Name 4  PPT  -CN      Cueing 4  Verbal  -CN      Reps 4  10  -CN      Time 4  5 sec  -CN        User Key  (r) = Recorded By, (t) = Taken By, (c) = Cosigned By    Initials Name Provider Type    Bela Barbosa PT Physical Therapist                        Outcome Measure Options: Lower Extremity Functional Scale (LEFS)(75/80)         Time Calculation:     Start Time: 1430  Stop Time: 1515  Time Calculation (min): 45 min     Therapy Charges for Today     Code Description Service Date Service Provider Modifiers Qty    88022710203  PT THER PROC EA 15 MIN 10/1/2019 Bela Khoury, PT GP 1    [id number]  PT EVAL LOW COMPLEXITY 2 10/1/2019 Bela Khoury, PT GP 1          PT G-Codes  Outcome Measure Options: Lower Extremity Functional Scale (LEFS)(75/80)         Bela Khoury PT  10/1/2019

## 2019-10-14 ENCOUNTER — HOSPITAL ENCOUNTER (OUTPATIENT)
Dept: PHYSICAL THERAPY | Facility: HOSPITAL | Age: 34
Setting detail: THERAPIES SERIES
Discharge: HOME | End: 2019-10-14

## 2019-10-14 DIAGNOSIS — M25.552 LEFT HIP PAIN: Primary | ICD-10-CM

## 2019-10-14 DIAGNOSIS — R29.898 LEFT LEG WEAKNESS: ICD-10-CM

## 2019-10-14 PROCEDURE — 97110 THERAPEUTIC EXERCISES: CPT

## 2019-10-14 PROCEDURE — 97140 MANUAL THERAPY 1/> REGIONS: CPT

## 2019-10-14 NOTE — THERAPY TREATMENT NOTE
Outpatient Physical Therapy Ortho Treatment Note  [facility]     Patient Name: Olga Szymanski  : 1985  MRN: [id number]  Today's Date: 10/14/2019      Visit Date: 10/14/2019    Visit Dx:    ICD-10-CM ICD-9-CM   1. Left hip pain M25.552 719.45   2. Left leg weakness R29.898 729.89       Patient Active Problem List   Diagnosis   • Allergic   • Asthma        Past Medical History:   Diagnosis Date   • Allergic    • Asthma    • Depression    • HPV in female     followed by Gynecology        No past surgical history on file.                    PT Assessment/Plan     Row Name 10/14/19 1555          PT Assessment    Assessment Comments  Pt returns for initial follow up after evaluation and reports reduced pain levels overall. Pt reports feelings of weakness in L hip after performing hip stretches likely due to instability and added several  hip strengthening exercises today. Pt also with symptoms down leg after performing initial HEP and corrected form with 90/90 HS stretch with ankle pump. Instructed on performance of self hip release with tennis ball at home.   -CN        PT Plan    PT Plan Comments  Assess response to added exercises and manual techniques and consider updating HEP. Add resisted sidestepping and prone hip extensor exercises next visit.   -CN       User Key  (r) = Recorded By, (t) = Taken By, (c) = Cosigned By    Initials Name Provider Type    Bela Barbosa, PT Physical Therapist            OP Exercises     Row Name 10/14/19 1500             Subjective Comments    Subjective Comments  The pain is actually a little better. I feel like that hip is weaker though and I have been getting these twitches down my leg after I do the exercises.   -CN         Subjective Pain    Able to rate subjective pain?  yes  -CN      Pre-Treatment Pain Level  3  -CN         Total Minutes    00803 - PT Therapeutic Exercise Minutes  30  -CN      16902 - PT Manual Therapy Minutes  10  -CN          Exercise 1    Exercise Name 1  Piriformis stretch (reach through)  -CN      Cueing 1  Verbal  -CN      Reps 1  3  -CN      Time 1  20 sec  -CN         Exercise 2    Exercise Name 2  90/90 HS stretch with ankle pump  -CN      Cueing 2  Verbal;Demo  -CN      Reps 2  10  -CN      Time 2  10 pumps  -CN         Exercise 3    Exercise Name 3  LTR  -CN      Cueing 3  Verbal  -CN      Reps 3  10  -CN      Time 3  5 sec  -CN         Exercise 4    Exercise Name 4  PPT  -CN      Cueing 4  Verbal  -CN      Reps 4  10  -CN      Time 4  5 sec  -CN         Exercise 5    Exercise Name 5  Recumbent bike  -CN      Time 5  5 min  -CN         Exercise 6    Exercise Name 6  GS  -CN      Cueing 6  Verbal  -CN      Reps 6  15  -CN      Time 6  5 sec  -CN         Exercise 7    Exercise Name 7  Bridges with PPT and GS  -CN      Cueing 7  Verbal  -CN      Reps 7  10  -CN         Exercise 8    Exercise Name 8  SL clamshells  -CN      Cueing 8  Verbal  -CN      Reps 8  10 B  -CN      Additional Comments  Pt reports pain wrapping around hip on the L  -CN         Exercise 9    Exercise Name 9  Prone hip extension over pillow  -CN      Cueing 9  Demo  -CN      Reps 9  10 B  -CN        User Key  (r) = Recorded By, (t) = Taken By, (c) = Cosigned By    Initials Name Provider Type    Bela Barbosa, MARIPOSA Physical Therapist                      Manual Rx (last 36 hours)      Manual Treatments     Row Name 10/14/19 1500             Total Minutes    80319 - PT Manual Therapy Minutes  10  -CN         Manual Rx 1    Manual Rx 1 Location  stm to L glute/piriformis tissues; pt in R SLing with pillow between knees  -CN        User Key  (r) = Recorded By, (t) = Taken By, (c) = Cosigned By    Initials Name Provider Type    Bela Barbosa, MARIPOSA Physical Therapist          PT OP Goals     Row Name 10/14/19 1600          PT Short Term Goals    STG Date to Achieve  10/22/19  -CN     STG 1  Pt will report pain rated 2/10 at worst in order to  demonstrate ability to return to normalized ADLs and functional activities.  -CN     STG 1 Progress  Ongoing  -CN     STG 1 Progress Comments  Pt reports pain rated 3/10 today.   -CN     STG 2  Pt will be independent with initial HEP for symptom management.  -CN     STG 2 Progress  Ongoing  -CN        Long Term Goals    LTG Date to Achieve  11/12/19  -CN     LTG 1  Pt will be independent and compliant with advanced HEP for long term management of symptoms and prevention of future occurrence.   -CN     LTG 1 Progress  Ongoing  -CN     LTG 2  Pt will be able to demonstrate hip abd and extension strength of 4/5 B in order to improve stability with recreational activities.   -CN     LTG 2 Progress  Ongoing  -CN     LTG 3  Pt will be able to return to bootcamp activities with minimal reports of pain and good form in order to decrease changes of future recurrence of symptoms.   -CN     LTG 3 Progress  Ongoing  -CN       User Key  (r) = Recorded By, (t) = Taken By, (c) = Cosigned By    Initials Name Provider Type    Bela Barbosa, PT Physical Therapist          Therapy Education  Given: HEP, Symptoms/condition management, Pain management, Posture/body mechanics  Program: Progressed  How Provided: Verbal, Demonstration  Provided to: Patient  Level of Understanding: Teach back education performed, Verbalized, Demonstrated              Time Calculation:   Start Time: 1520  Stop Time: 1600  Time Calculation (min): 40 min  Therapy Charges for Today     Code Description Service Date Service Provider Modifiers Qty    25324979166  PT THER PROC EA 15 MIN 10/14/2019 Bela Khoury, PT GP 2    [id number]  PT MANUAL THERAPY EA 15 MIN 10/14/2019 Bela Khoury, PT GP 1                    Bela Khoury PT  10/14/2019

## 2019-10-16 ENCOUNTER — HOSPITAL ENCOUNTER (OUTPATIENT)
Dept: PHYSICAL THERAPY | Facility: HOSPITAL | Age: 34
Setting detail: THERAPIES SERIES
Discharge: HOME | End: 2019-10-16

## 2019-10-16 DIAGNOSIS — M25.552 LEFT HIP PAIN: Primary | ICD-10-CM

## 2019-10-16 DIAGNOSIS — R29.898 LEFT LEG WEAKNESS: ICD-10-CM

## 2019-10-16 PROCEDURE — 97110 THERAPEUTIC EXERCISES: CPT

## 2019-10-16 NOTE — THERAPY TREATMENT NOTE
Outpatient Physical Therapy Ortho Treatment Note  [facility]     Patient Name: Olga Szymanski  : 1985  MRN: [id number]  Today's Date: 10/16/2019      Visit Date: 10/16/2019    Visit Dx:    ICD-10-CM ICD-9-CM   1. Left hip pain M25.552 719.45   2. Left leg weakness R29.898 729.89       Patient Active Problem List   Diagnosis   • Allergic   • Asthma        Past Medical History:   Diagnosis Date   • Allergic    • Asthma    • Depression    • HPV in female     followed by Gynecology        No past surgical history on file.                    PT Assessment/Plan     Row Name 10/16/19 1500          PT Assessment    Assessment Comments  Pt reports increased pain after last visit in groin/medial HS, however went to bootcamp class immediately following PT session. Discussed holding on bootcamp during this time as unsure if flare up in symptoms attributed to PT regimen vs bootcamp activities. Pt agreeable to plan and able to progress exercises today with good form.   -CN        PT Plan    PT Plan Comments  Assess response to added exercises and consider planks and seated D1 extension on swiss ball next visit.   -CN       User Key  (r) = Recorded By, (t) = Taken By, (c) = Cosigned By    Initials Name Provider Type    Bela Barbosa, PT Physical Therapist            OP Exercises     Row Name 10/16/19 1400             Subjective Comments    Subjective Comments  It was sore after last time. I have started to notice that the inside of my thigh is sore though.   -CN         Subjective Pain    Able to rate subjective pain?  yes  -CN      Pre-Treatment Pain Level  3  -CN         Total Minutes    04754 - PT Therapeutic Exercise Minutes  40  -CN         Exercise 1    Exercise Name 1  Piriformis stretch (reach through)  -CN      Cueing 1  Verbal  -CN      Reps 1  3  -CN      Time 1  20 sec  -CN         Exercise 2    Exercise Name 2  90/90 HS stretch with ankle pump  -CN      Cueing 2  Verbal;Demo  -CN       Reps 2  10  -CN      Time 2  10 pumps  -CN         Exercise 3    Exercise Name 3  LTR  -CN      Cueing 3  Verbal  -CN      Reps 3  10  -CN      Time 3  5 sec  -CN         Exercise 4    Exercise Name 4  PPT with addution  -CN      Cueing 4  Verbal  -CN      Reps 4  10  -CN      Time 4  5 sec  -CN         Exercise 5    Exercise Name 5  Recumbent bike  -CN      Time 5  5 min  -CN         Exercise 6    Exercise Name 6  GS  -CN      Cueing 6  Verbal  -CN      Reps 6  15  -CN      Time 6  5 sec  -CN         Exercise 7    Exercise Name 7  Bridges with PPT and GS  -CN      Cueing 7  Verbal  -CN      Sets 7  2  -CN      Reps 7  10  -CN         Exercise 8    Exercise Name 8  SL clamshells, pillow between knees  -CN      Cueing 8  Verbal  -CN      Reps 8  10 B  -CN         Exercise 9    Exercise Name 9  Prone hip extension over pillow  -CN      Cueing 9  Demo  -CN      Reps 9  10 B  -CN         Exercise 10    Exercise Name 10  Supine green swiss ball roll ins with TA  -CN      Cueing 10  Verbal  -CN      Reps 10  10  -CN         Exercise 11    Exercise Name 11  Tall kneeling with PPT and slight recline and HA  -CN      Cueing 11  Demo  -CN      Reps 11  10  -CN      Additional Comments  RTB  -CN         Exercise 12    Exercise Name 12  Quadruped alt LE lift with bar on back  -CN      Cueing 12  Demo  -CN      Reps 12  10  -CN        User Key  (r) = Recorded By, (t) = Taken By, (c) = Cosigned By    Initials Name Provider Type    Bela Barbosa, PT Physical Therapist                           Therapy Education  Given: HEP, Symptoms/condition management, Pain management, Posture/body mechanics  Program: Progressed  How Provided: Verbal, Demonstration  Provided to: Patient  Level of Understanding: Teach back education performed, Verbalized, Demonstrated              Time Calculation:   Start Time: 1418  Stop Time: 1458  Time Calculation (min): 40 min  Therapy Charges for Today     Code Description Service Date  Service Provider Modifiers Qty    [id number] HC PT THER PROC EA 15 MIN 10/16/2019 Bela Khoury, PT GP 3                    Bela Khoury, PT  10/16/2019

## 2019-10-21 ENCOUNTER — HOSPITAL ENCOUNTER (OUTPATIENT)
Dept: PHYSICAL THERAPY | Facility: HOSPITAL | Age: 34
Setting detail: THERAPIES SERIES
Discharge: HOME | End: 2019-10-21

## 2019-10-21 DIAGNOSIS — R29.898 LEFT LEG WEAKNESS: ICD-10-CM

## 2019-10-21 DIAGNOSIS — M25.552 LEFT HIP PAIN: Primary | ICD-10-CM

## 2019-10-21 PROCEDURE — 97110 THERAPEUTIC EXERCISES: CPT

## 2019-10-21 NOTE — THERAPY TREATMENT NOTE
Outpatient Physical Therapy Ortho Treatment Note  [facility]     Patient Name: Olga Szymanski  : 1985  MRN: [id number]  Today's Date: 10/21/2019      Visit Date: 10/21/2019    Visit Dx:    ICD-10-CM ICD-9-CM   1. Left hip pain M25.552 719.45   2. Left leg weakness R29.898 729.89       Patient Active Problem List   Diagnosis   • Allergic   • Asthma        Past Medical History:   Diagnosis Date   • Allergic    • Asthma    • Depression    • HPV in female     followed by Gynecology        No past surgical history on file.                    PT Assessment/Plan     Row Name 10/21/19 1514          PT Assessment    Assessment Comments  Pt reporting inc pain in L low back, L buttocks area over weekend after 2 2 mile hikes. She continue to hold boot camp classes but report limited change in overall condition. Lengthy discussion today discussing need for rest, adequate time to reduce inflammation, allow stabilization to occur as well as the difference between strength and stability. Focused on core/LE stability with more TA activation and less PPT as pt tends to over rotate pelvis with upper torso compensation rather than engage core. Pt appeared to understand goals today, she was able to demonstrate appropriate TA stabilization in standing and on swiss ball. Plan to hold boot camp for next month and allow re-stabilization training, then progress aerobic activities to prepare for hiking trip at end of December.   -LB        PT Plan    PT Plan Comments  Assess tolerance to last session. Consider planks, D1 extension on swiss ball, superman alternating.  -LB       User Key  (r) = Recorded By, (t) = Taken By, (c) = Cosigned By    Initials Name Provider Type    Brianna Chua PT Physical Therapist            OP Exercises     Row Name 10/21/19 1300             Subjective Comments    Subjective Comments  I went hiking over the weekend and I really was hurting. I felt like if I took one wrong step I was  going to be unable to walk. It was almost catching.  -LB         Subjective Pain    Able to rate subjective pain?  yes  -LB      Pre-Treatment Pain Level  0  -LB      Subjective Pain Comment  I don't feel anything today.  -LB         Total Minutes    93490 - PT Therapeutic Exercise Minutes  44  -LB         Exercise 1    Exercise Name 1  Piriformis stretch (reach through)  -LB      Cueing 1  Verbal  -LB      Reps 1  3  -LB      Time 1  20 sec  -LB         Exercise 2    Exercise Name 2  90/90 HS stretch with ankle pump  -LB      Cueing 2  Verbal;Demo  -LB      Reps 2  10  -LB      Time 2  10 pumps  -LB         Exercise 4    Exercise Name 4  DKTC with green ball with TA  -LB      Cueing 4  Verbal  -LB      Reps 4  10  -LB      Time 4  10  -LB      Additional Comments  encouraged elevator to raise pelvic floor  -LB         Exercise 5    Exercise Name 5  Recumbent bike  -LB      Time 5  5 min  -LB         Exercise 6    Exercise Name 6  --  -LB      Cueing 6  --  -LB      Reps 6  --  -LB      Time 6  --  -LB         Exercise 7    Exercise Name 7  bridges with GS  -LB      Cueing 7  Verbal  -LB      Sets 7  2  -LB      Reps 7  10  -LB      Additional Comments  on green ball  -LB         Exercise 8    Exercise Name 8  --  -LB      Cueing 8  --  -LB      Reps 8  --  -LB         Exercise 9    Exercise Name 9  --  -LB      Cueing 9  --  -LB      Reps 9  --  -LB         Exercise 10    Exercise Name 10  seated ball marches on blue ball  -LB      Cueing 10  --  -LB      Sets 10  2  -LB      Reps 10  10  -LB      Additional Comments  alternating w/ slight lumbar extension to engage core  -LB         Exercise 11    Exercise Name 11  --  -LB      Cueing 11  --  -LB      Reps 11  --  -LB         Exercise 12    Exercise Name 12  --  -LB      Cueing 12  --  -LB      Reps 12  --  -LB         Exercise 13    Exercise Name 13  BOSU step ups (black side up) with RLE    -LB      Reps 13  15  -LB      Time 13  3  -LB      Additional  Comments  no UE assist; cuing for TA  -LB         Exercise 14    Exercise Name 14  lateral walkout TS  -LB      Reps 14  4 laps  -LB      Additional Comments  40# with TA  -LB         Exercise 15    Exercise Name 15  seated #2 ball circles seated on blue ball  -LB      Sets 15  2  -LB      Reps 15  10  -LB      Additional Comments  cuing for TA  -LB         Exercise 16    Exercise Name 16  SL deadlift with #2 ball  -LB      Reps 16  10  -LB      Additional Comments  cuing for glute squeeze to return to stand  -LB         Exercise 17    Exercise Name 17  lateral/monster walk  -LB      Sets 17  2  -LB      Reps 17  20  -LB      Additional Comments  each; GTB  -LB         Exercise 18    Exercise Name 18  straight arm pulldown with TA TS  -LB      Reps 18  10  -LB      Additional Comments  30#   -LB        User Key  (r) = Recorded By, (t) = Taken By, (c) = Cosigned By    Initials Name Provider Type    Brianna Chua, PT Physical Therapist                       PT OP Goals     Row Name 10/21/19 1500          PT Short Term Goals    STG Date to Achieve  10/22/19  -LB     STG 1  Pt will report pain rated 2/10 at worst in order to demonstrate ability to return to normalized ADLs and functional activities.  -LB     STG 1 Progress  Ongoing  -LB     STG 2  Pt will be independent with initial HEP for symptom management.  -LB     STG 2 Progress  Ongoing  -LB        Long Term Goals    LTG Date to Achieve  11/12/19  -LB     LTG 1  Pt will be independent and compliant with advanced HEP for long term management of symptoms and prevention of future occurrence.   -LB     LTG 1 Progress  Ongoing  -LB     LTG 2  Pt will be able to demonstrate hip abd and extension strength of 4/5 B in order to improve stability with recreational activities.   -LB     LTG 2 Progress  Ongoing  -LB     LTG 3  Pt will be able to return to bootcamp activities with minimal reports of pain and good form in order to decrease changes of future recurrence of  symptoms.   -LB     LTG 3 Progress  Ongoing  -LB       User Key  (r) = Recorded By, (t) = Taken By, (c) = Cosigned By    Initials Name Provider Type    Brianna Chua, PT Physical Therapist          Therapy Education  Education Details: added stability ball exercises to program, encouraged holding boot camp, discussed stability vs. strength, need to reduce aggravating activities.  Given: Symptoms/condition management, HEP  Program: Progressed  How Provided: Verbal, Demonstration, Written  Provided to: Patient  Level of Understanding: Demonstrated, Verbalized, Teach back education performed              Time Calculation:   Start Time: 1300  Stop Time: 1345  Time Calculation (min): 45 min  Total Timed Code Minutes- PT: 44 minute(s)  Therapy Charges for Today     Code Description Service Date Service Provider Modifiers Qty    05506445563 HC PT THER PROC EA 15 MIN 10/21/2019 Brianna Gross, PT GP 3                    Brianna Gross PT  10/21/2019

## 2019-10-23 ENCOUNTER — HOSPITAL ENCOUNTER (OUTPATIENT)
Dept: PHYSICAL THERAPY | Facility: HOSPITAL | Age: 34
Setting detail: THERAPIES SERIES
Discharge: HOME | End: 2019-10-23

## 2019-10-23 DIAGNOSIS — M25.552 LEFT HIP PAIN: ICD-10-CM

## 2019-10-23 DIAGNOSIS — R29.898 LEFT LEG WEAKNESS: Primary | ICD-10-CM

## 2019-10-23 PROCEDURE — 97110 THERAPEUTIC EXERCISES: CPT

## 2019-10-23 NOTE — THERAPY TREATMENT NOTE
Outpatient Physical Therapy Ortho Treatment Note  [facility]     Patient Name: Olga Szymanski  : 1985  MRN: [id number]  Today's Date: 10/23/2019      Visit Date: 10/23/2019    Visit Dx:    ICD-10-CM ICD-9-CM   1. Left leg weakness R29.898 729.89   2. Left hip pain M25.552 719.45       Patient Active Problem List   Diagnosis   • Allergic   • Asthma        Past Medical History:   Diagnosis Date   • Allergic    • Asthma    • Depression    • HPV in female     followed by Gynecology        No past surgical history on file.                    PT Assessment/Plan     Row Name 10/23/19 1802          PT Assessment    Assessment Comments  Pt reports good tolerance to last session. She did have pain with walking dog yesterday and had to walk back home once pain started. Continued core/hip stability exercises. Possible multifidus irritation and dec strength as pt has inc pain with rotational movements.   -LB        PT Plan    PT Plan Comments  Continue core/hip stabilization. consider planks, controlled mountain climber.  -LB       User Key  (r) = Recorded By, (t) = Taken By, (c) = Cosigned By    Initials Name Provider Type    LB Brianna Gross, PT Physical Therapist            OP Exercises     Row Name 10/23/19 1600             Subjective Comments    Subjective Comments  I did walk my dog yesterday and i stopped when it started hurting but I did have to walk back.  -LB         Subjective Pain    Able to rate subjective pain?  yes  -LB      Pre-Treatment Pain Level  1  -LB         Total Minutes    65713 - PT Therapeutic Exercise Minutes  40  -LB         Exercise 1    Exercise Name 1  Piriformis stretch (reach through)  -LB      Cueing 1  Verbal  -LB      Reps 1  3  -LB      Time 1  20 sec  -LB         Exercise 2    Exercise Name 2  90/90 HS stretch with ankle pump  -LB      Cueing 2  Verbal;Demo  -LB      Reps 2  10  -LB      Time 2  10 pumps  -LB         Exercise 3    Exercise Name 3  seated D1 flexion  TS  -LB      Sets 3  2  -LB      Reps 3  10  -LB      Additional Comments  B; 20# on blue ball  -LB         Exercise 4    Exercise Name 4  DKTC with green ball with TA  -LB      Cueing 4  Verbal  -LB      Reps 4  10  -LB      Time 4  10  -LB         Exercise 5    Exercise Name 5  Recumbent bike  -LB      Time 5  5 min  -LB         Exercise 6    Exercise Name 6  prone hip extension  -LB      Reps 6  10  -LB      Additional Comments  each; cuing for glute set  -LB         Exercise 7    Exercise Name 7  bridges with GS  -LB      Cueing 7  Verbal  -LB      Sets 7  2  -LB      Reps 7  10  -LB      Additional Comments  on green ball  -LB         Exercise 10    Exercise Name 10  seated ball marches on blue ball  -LB      Sets 10  2  -LB      Reps 10  10  -LB      Additional Comments  alternating w/ slight lumbar extension to engage core  -LB         Exercise 12    Exercise Name 12  Qped bird/dog  -LB      Reps 12  10  -LB         Exercise 13    Exercise Name 13  BOSU step ups (black side up) with RLE    -LB      Reps 13  15  -LB      Time 13  3  -LB      Additional Comments  no UE assist; cuing for TA  -LB         Exercise 14    Exercise Name 14  lat pull seated on blue ball  -LB      Sets 14  2  -LB      Reps 14  10  -LB      Additional Comments  TS 40#  -LB         Exercise 15    Exercise Name 15  seated #2 ball circles seated on blue ball  -LB      Sets 15  2  -LB      Reps 15  10  -LB      Additional Comments  cuing for TA  -LB         Exercise 16    Exercise Name 16  --  -LB      Reps 16  --  -LB         Exercise 17    Exercise Name 17  TS row in partial squat  -LB      Sets 17  2  -LB      Reps 17  10  -LB      Additional Comments  30#; cuing for glute/TA  -LB         Exercise 18    Exercise Name 18  --  -LB      Reps 18  --  -LB         Exercise 19    Exercise Name 19  SLS on blue foam  -LB      Sets 19  2  -LB      Reps 19  15  -LB      Additional Comments  row; B  -LB        User Key  (r) = Recorded By, (t)  = Taken By, (c) = Cosigned By    Initials Name Provider Type    Brianna Chua, PT Physical Therapist                       PT OP Goals     Row Name 10/23/19 1800          PT Short Term Goals    STG Date to Achieve  10/22/19  -LB     STG 1  Pt will report pain rated 2/10 at worst in order to demonstrate ability to return to normalized ADLs and functional activities.  -LB     STG 1 Progress  Ongoing  -LB     STG 2  Pt will be independent with initial HEP for symptom management.  -LB     STG 2 Progress  Met  -LB        Long Term Goals    LTG Date to Achieve  11/12/19  -LB     LTG 1  Pt will be independent and compliant with advanced HEP for long term management of symptoms and prevention of future occurrence.   -LB     LTG 1 Progress  Ongoing  -LB     LTG 1 Progress Comments  Continued to progress.  -LB     LTG 2  Pt will be able to demonstrate hip abd and extension strength of 4/5 B in order to improve stability with recreational activities.   -LB     LTG 2 Progress  Ongoing  -LB     LTG 3  Pt will be able to return to bootcamp activities with minimal reports of pain and good form in order to decrease changes of future recurrence of symptoms.   -LB     LTG 3 Progress  Ongoing  -LB       User Key  (r) = Recorded By, (t) = Taken By, (c) = Cosigned By    Initials Name Provider Type    Brianna Chua PT Physical Therapist          Therapy Education  Education Details: reviewed HEP, discussed walking 1/2 distance on walk to allow for return home  Given: Symptoms/condition management, HEP  Program: Reinforced  How Provided: Verbal, Demonstration  Provided to: Patient  Level of Understanding: Teach back education performed, Demonstrated, Verbalized              Time Calculation:   Start Time: 1615  Stop Time: 1700  Time Calculation (min): 45 min  Total Timed Code Minutes- PT: 43 minute(s)  Therapy Charges for Today     Code Description Service Date Service Provider Modifiers Qty    23513477215 HC PT THER PROC EA 15 MIN  10/23/2019 Brianna Gross, PT GP 3                    Brianna Gross, PT  10/23/2019

## 2019-10-28 ENCOUNTER — HOSPITAL ENCOUNTER (OUTPATIENT)
Dept: PHYSICAL THERAPY | Facility: HOSPITAL | Age: 34
Setting detail: THERAPIES SERIES
Discharge: HOME | End: 2019-10-28

## 2019-10-28 DIAGNOSIS — M25.552 LEFT HIP PAIN: ICD-10-CM

## 2019-10-28 DIAGNOSIS — R29.898 LEFT LEG WEAKNESS: Primary | ICD-10-CM

## 2019-10-28 PROCEDURE — 97110 THERAPEUTIC EXERCISES: CPT

## 2019-10-28 NOTE — THERAPY TREATMENT NOTE
Outpatient Physical Therapy Ortho Treatment Note  [facility]     Patient Name: Olga Szymanski  : 1985  MRN: [id number]  Today's Date: 10/28/2019      Visit Date: 10/28/2019    Visit Dx:    ICD-10-CM ICD-9-CM   1. Left leg weakness R29.898 729.89   2. Left hip pain M25.552 719.45       Patient Active Problem List   Diagnosis   • Allergic   • Asthma        Past Medical History:   Diagnosis Date   • Allergic    • Asthma    • Depression    • HPV in female     followed by Gynecology        No past surgical history on file.                    PT Assessment/Plan     Row Name 10/28/19 5717          PT Assessment    Assessment Comments  Pt reporting decreased symptoms with HEP, however voices concerns due to onset of shooting pains with increased activity. Advised pt that likely core and hip stabilizing tissues are fatiguing and unable to stabilize effectively with overactivity. Advised to pace work and recreational tasks throught the week to allow tissues recovery time and optimize strengthening.   -CN        PT Plan    PT Plan Comments  Assess response to planks and consider side planks and mountain climbers next visit.   -CN       User Key  (r) = Recorded By, (t) = Taken By, (c) = Cosigned By    Initials Name Provider Type    Bela Barbosa, PT Physical Therapist            OP Exercises     Row Name 10/28/19 1600             Subjective Comments    Subjective Comments  I am getting some shooting pain at times. I had a really busy day at work on Friday then I took my dog to the park. I feel really good today though. Pt arrived at 4:06 for 4:00 appointment.   -CN         Subjective Pain    Able to rate subjective pain?  yes  -CN      Pre-Treatment Pain Level  1  -CN         Total Minutes    27794 - PT Therapeutic Exercise Minutes  38  -CN         Exercise 1    Exercise Name 1  Piriformis stretch (reach through)  -CN      Cueing 1  Verbal  -CN      Reps 1  3  -CN      Time 1  20 sec  -CN          Exercise 2    Exercise Name 2  90/90 HS stretch with ankle pump  -CN      Cueing 2  Verbal;Demo  -CN      Reps 2  10  -CN      Time 2  10 pumps  -CN         Exercise 4    Exercise Name 4  DKTC with green ball with TA  -CN      Cueing 4  Verbal  -CN      Reps 4  10  -CN      Time 4  10  -CN         Exercise 5    Exercise Name 5  Recumbent bike  -CN      Time 5  5 min  -CN         Exercise 6    Exercise Name 6  prone hip extension  -CN      Sets 6  2  -CN      Reps 6  10  -CN         Exercise 7    Exercise Name 7  bridges with GS  -CN      Cueing 7  Verbal  -CN      Sets 7  2  -CN      Reps 7  10  -CN      Additional Comments  on green ball  -CN         Exercise 10    Exercise Name 10  seated ball marches on blue ball  -CN      Sets 10  2  -CN      Reps 10  10  -CN      Additional Comments  alternating w/ slight lumbar extension to engage core  -CN         Exercise 11    Exercise Name 11  Forward planks  -CN      Cueing 11  Demo  -CN      Reps 11  3  -CN      Time 11  15 sec  -CN         Exercise 12    Exercise Name 12  Qped bird/dog  -CN      Reps 12  10  -CN         Exercise 15    Exercise Name 15  seated #2 ball circles seated on blue ball  -CN      Sets 15  2  -CN      Reps 15  10  -CN      Additional Comments  cuing for TA  -CN         Exercise 19    Exercise Name 19  SLS on blue foam  -CN      Sets 19  2  -CN      Reps 19  15  -CN      Additional Comments  row; B  -CN         Exercise 20    Exercise Name 20  Forward planks  -CN      Cueing 20  Demo;Verbal;Tactile  -CN      Reps 20  3  -CN      Time 20  15 sec  -CN      Additional Comments  many cues for correct form  -CN        User Key  (r) = Recorded By, (t) = Taken By, (c) = Cosigned By    Initials Name Provider Type    CN Bela Khoury, PT Physical Therapist                       PT OP Goals     Row Name 10/28/19 1700          PT Short Term Goals    STG Date to Achieve  10/22/19  -CN     STG 1  Pt will report pain rated 2/10 at worst in  order to demonstrate ability to return to normalized ADLs and functional activities.  -CN     STG 1 Progress  Ongoing  -CN     STG 1 Progress Comments  Pt reports new onset of shooting pains with overactivity.   -CN     STG 2  Pt will be independent with initial HEP for symptom management.  -CN     STG 2 Progress  Met  -CN        Long Term Goals    LTG Date to Achieve  11/12/19  -CN     LTG 1  Pt will be independent and compliant with advanced HEP for long term management of symptoms and prevention of future occurrence.   -CN     LTG 1 Progress  Ongoing  -CN     LTG 2  Pt will be able to demonstrate hip abd and extension strength of 4/5 B in order to improve stability with recreational activities.   -CN     LTG 2 Progress  Ongoing  -CN     LTG 3  Pt will be able to return to bootcamp activities with minimal reports of pain and good form in order to decrease changes of future recurrence of symptoms.   -CN     LTG 3 Progress  Ongoing  -CN       User Key  (r) = Recorded By, (t) = Taken By, (c) = Cosigned By    Initials Name Provider Type    Bela Barbosa, PT Physical Therapist          Therapy Education  Given: Symptoms/condition management, HEP  Program: Reinforced  How Provided: Verbal, Demonstration  Provided to: Patient  Level of Understanding: Teach back education performed, Demonstrated, Verbalized              Time Calculation:   Start Time: 1606  Stop Time: 1644  Time Calculation (min): 38 min  Therapy Charges for Today     Code Description Service Date Service Provider Modifiers Qty    91601744946  PT THER PROC EA 15 MIN 10/28/2019 Bela Khoury, PT GP 3                    Bela Khoury PT  10/28/2019

## 2019-10-29 ENCOUNTER — OFFICE VISIT (OUTPATIENT)
Dept: FAMILY MEDICINE CLINIC | Facility: CLINIC | Age: 34
End: 2019-10-29

## 2019-10-29 VITALS
WEIGHT: 196 LBS | OXYGEN SATURATION: 98 % | HEIGHT: 66 IN | DIASTOLIC BLOOD PRESSURE: 80 MMHG | HEART RATE: 78 BPM | BODY MASS INDEX: 31.5 KG/M2 | SYSTOLIC BLOOD PRESSURE: 120 MMHG

## 2019-10-29 DIAGNOSIS — M54.50 ACUTE BILATERAL LOW BACK PAIN WITHOUT SCIATICA: Primary | ICD-10-CM

## 2019-10-29 PROCEDURE — 99213 OFFICE O/P EST LOW 20 MIN: CPT | Performed by: NURSE PRACTITIONER

## 2019-10-29 NOTE — PROGRESS NOTES
Subjective has low back pain  Olga Szymanski is a 33 y.o. female.   Trying to have a baby and saw her gynecologist on Monday and had some bloodwork  She has had lower back pain for a while it is intermittent and nothing seems to help it. She has not injured it.    History of Present Illness     The following portions of the patient's history were reviewed and updated as appropriate: allergies, current medications, past family history, past medical history, past social history, past surgical history and problem list.    Review of Systems   Constitutional: Positive for activity change. Negative for appetite change.   Respiratory: Negative for cough.    Musculoskeletal: Positive for back pain.   Neurological: Negative for dizziness and confusion.       Objective   Physical Exam   Constitutional: She is oriented to person, place, and time. She appears well-developed and well-nourished.   HENT:   Head: Normocephalic and atraumatic.   Right Ear: External ear normal.   Left Ear: External ear normal.   Mouth/Throat: Oropharynx is clear and moist.   Eyes: Conjunctivae and EOM are normal. Pupils are equal, round, and reactive to light.   Neck: Neck supple.   Cardiovascular: Normal rate and regular rhythm.   Pulmonary/Chest: Effort normal and breath sounds normal. No respiratory distress.   Abdominal: Bowel sounds are normal.   Musculoskeletal: Normal range of motion.   Lymphadenopathy:     She has no cervical adenopathy.   Neurological: She is alert and oriented to person, place, and time.   Skin: Skin is warm and dry.   Psychiatric: She has a normal mood and affect.   Nursing note and vitals reviewed.        Assessment/Plan   Olga was seen today for back pain.    Diagnoses and all orders for this visit:    Acute bilateral low back pain without sciatica  -     XR Spine Lumbar 2 or 3 View; Future  -     XR Spine Cervical 2 or 3 View; Future        Imaging was ordered and when pt starts her menstrual cycle she will then  go get her x-rays

## 2019-10-30 ENCOUNTER — APPOINTMENT (OUTPATIENT)
Dept: PHYSICAL THERAPY | Facility: HOSPITAL | Age: 34
End: 2019-10-30

## 2019-10-31 NOTE — PATIENT INSTRUCTIONS
Acute Back Pain, Adult  Acute back pain is sudden and usually short-lived. It is often caused by an injury to the muscles and tissues in the back. The injury may result from:  · A muscle or ligament getting overstretched or torn (strained). Ligaments are tissues that connect bones to each other. Lifting something improperly can cause a back strain.  · Wear and tear (degeneration) of the spinal disks. Spinal disks are circular tissue that provides cushioning between the bones of the spine (vertebrae).  · Twisting motions, such as while playing sports or doing yard work.  · A hit to the back.  · Arthritis.  You may have a physical exam, lab tests, and imaging tests to find the cause of your pain. Acute back pain usually goes away with rest and home care.  Follow these instructions at home:  Managing pain, stiffness, and swelling  · Take over-the-counter and prescription medicines only as told by your health care provider.  · Your health care provider may recommend applying ice during the first 24-48 hours after your pain starts. To do this:  ? Put ice in a plastic bag.  ? Place a towel between your skin and the bag.  ? Leave the ice on for 20 minutes, 2-3 times a day.  · If directed, apply heat to the affected area as often as told by your health care provider. Use the heat source that your health care provider recommends, such as a moist heat pack or a heating pad.  ? Place a towel between your skin and the heat source.  ? Leave the heat on for 20-30 minutes.  ? Remove the heat if your skin turns bright red. This is especially important if you are unable to feel pain, heat, or cold. You have a greater risk of getting burned.  Activity    · Do not stay in bed. Staying in bed for more than 1-2 days can delay your recovery.  · Sit up and stand up straight. Avoid leaning forward when you sit, or hunching over when you stand.  ? If you work at a desk, sit close to it so you do not need to lean over. Keep your chin tucked  "in. Keep your neck drawn back, and keep your elbows bent at a right angle. Your arms should look like the letter \"L.\"  ? Sit high and close to the steering wheel when you drive. Add lower back (lumbar) support to your car seat, if needed.  · Take short walks on even surfaces as soon as you are able. Try to increase the length of time you walk each day.  · Do not sit, drive, or  one place for more than 30 minutes at a time. Sitting or standing for long periods of time can put stress on your back.  · Do not drive or use heavy machinery while taking prescription pain medicine.  · Use proper lifting techniques. When you bend and lift, use positions that put less stress on your back:  ? Bend your knees.  ? Keep the load close to your body.  ? Avoid twisting.  · Exercise regularly as told by your health care provider. Exercising helps your back heal faster and helps prevent back injuries by keeping muscles strong and flexible.  · Work with a physical therapist to make a safe exercise program, as recommended by your health care provider. Do any exercises as told by your physical therapist.  Lifestyle  · Maintain a healthy weight. Extra weight puts stress on your back and makes it difficult to have good posture.  · Avoid activities or situations that make you feel anxious or stressed. Stress and anxiety increase muscle tension and can make back pain worse. Learn ways to manage anxiety and stress, such as through exercise.  General instructions  · Sleep on a firm mattress in a comfortable position. Try lying on your side with your knees slightly bent. If you lie on your back, put a pillow under your knees.  · Follow your treatment plan as told by your health care provider. This may include:  ? Cognitive or behavioral therapy.  ? Acupuncture or massage therapy.  ? Meditation or yoga.  Contact a health care provider if:  · You have pain that is not relieved with rest or medicine.  · You have increasing pain going down " into your legs or buttocks.  · Your pain does not improve after 2 weeks.  · You have pain at night.  · You lose weight without trying.  · You have a fever or chills.  Get help right away if:  · You develop new bowel or bladder control problems.  · You have unusual weakness or numbness in your arms or legs.  · You develop nausea or vomiting.  · You develop abdominal pain.  · You feel faint.  Summary  · Acute back pain is sudden and usually short-lived.  · Use proper lifting techniques. When you bend and lift, use positions that put less stress on your back.  · Take over-the-counter and prescription medicines and apply heat or ice as directed by your health care provider.  This information is not intended to replace advice given to you by your health care provider. Make sure you discuss any questions you have with your health care provider.  Document Released: 12/18/2006 Document Revised: 07/25/2019 Document Reviewed: 08/01/2018  [employer] Interactive Patient Education © 2019 [employer]

## 2019-11-04 ENCOUNTER — HOSPITAL ENCOUNTER (OUTPATIENT)
Dept: PHYSICAL THERAPY | Facility: HOSPITAL | Age: 34
Setting detail: THERAPIES SERIES
Discharge: HOME | End: 2019-11-04

## 2019-11-04 DIAGNOSIS — R29.898 LEFT LEG WEAKNESS: Primary | ICD-10-CM

## 2019-11-04 DIAGNOSIS — M25.552 LEFT HIP PAIN: ICD-10-CM

## 2019-11-04 PROCEDURE — 97110 THERAPEUTIC EXERCISES: CPT

## 2019-11-04 NOTE — THERAPY TREATMENT NOTE
Outpatient Physical Therapy Ortho Treatment Note  [facility]     Patient Name: Olga Szymanski  : 1985  MRN: [id number]  Today's Date: 2019      Visit Date: 2019    Visit Dx:    ICD-10-CM ICD-9-CM   1. Left leg weakness R29.898 729.89   2. Left hip pain M25.552 719.45       Patient Active Problem List   Diagnosis   • Allergic   • Asthma        Past Medical History:   Diagnosis Date   • Allergic    • Asthma    • Depression    • HPV in female     followed by Gynecology        No past surgical history on file.                    PT Assessment/Plan     Row Name 19 1831          PT Assessment    Assessment Comments  Pt tolerating progression of LLE stabilization exercises well. Reports of L hamstring tightness with several exercises that are working that muscle. May be due to neural tension as well. Pt tends to compensate with inc weight on RLE in DLS as well as in plank position. Cuing to reduce inequality and improve form.   -LB        PT Plan    PT Plan Comments  Continue to progress stability exercises. Hold boot camp for 2 more weeks before returning to modified workout.  -LB       User Key  (r) = Recorded By, (t) = Taken By, (c) = Cosigned By    Initials Name Provider Type    LB Brianna Gross, PT Physical Therapist            OP Exercises     Row Name 19 1700             Subjective Comments    Subjective Comments  I am feeling better. I haven't been getting pain down my leg. I want to go to Boot Camp. Can I go back?  -LB         Subjective Pain    Able to rate subjective pain?  yes  -LB      Pre-Treatment Pain Level  0  -LB         Total Minutes    99782 - PT Therapeutic Exercise Minutes  40  -LB         Exercise 1    Exercise Name 1  Piriformis stretch (reach through)  -LB      Cueing 1  Verbal  -LB      Reps 1  3  -LB      Time 1  20 sec  -LB         Exercise 2    Exercise Name 2  90/90 HS stretch with ankle pump  -LB      Cueing 2  Verbal;Demo  -LB      Reps 2  10   -LB      Time 2  10 pumps  -LB         Exercise 3    Exercise Name 3  --  -LB      Sets 3  --  -LB      Reps 3  --  -LB         Exercise 4    Exercise Name 4  DKTC with green ball with TA  -LB      Cueing 4  Verbal  -LB      Reps 4  10  -LB      Time 4  10  -LB         Exercise 5    Exercise Name 5  Recumbent bike  -LB      Time 5  5 min  -LB         Exercise 6    Exercise Name 6  --  -LB      Sets 6  --  -LB      Reps 6  --  -LB         Exercise 7    Exercise Name 7  bridges with GS  -LB      Cueing 7  Verbal  -LB      Sets 7  --  -LB      Reps 7  10  -LB      Additional Comments  on green ball with HS curl  -LB         Exercise 8    Exercise Name 8  SL deadlift  -LB      Reps 8  15  -LB      Additional Comments  each #2 bal  -LB         Exercise 9    Exercise Name 9  lateral walkout TS  -LB      Sets 9  4  -LB      Reps 9  5  -LB      Additional Comments  30#; cuing for TA  -LB         Exercise 11    Exercise Name 11  Forward planks  -LB      Cueing 11  Demo  -LB      Reps 11  3  -LB      Time 11  15 sec  -LB         Exercise 12    Exercise Name 12  Qped bird/dog  -LB      Reps 12  10  -LB      Additional Comments  without placing knee back on table  -LB         Exercise 13    Exercise Name 13  BOSU step ups (black side up) with RLE    -LB      Reps 13  15  -LB      Time 13  3  -LB         Exercise 14    Exercise Name 14  tall kneeling D2 flexion  -LB      Reps 14  10  -LB      Additional Comments  with rotation of trunk BTB  -LB         Exercise 15    Exercise Name 15  --  -LB      Sets 15  --  -LB      Reps 15  --  -LB      Additional Comments  --  -LB         Exercise 16    Exercise Name 16  mountain climber on extended elbows   -LB      Reps 16  10  -LB      Additional Comments  each leg; cuing for TA; hands on mat, feet on floor  -LB         Exercise 17    Exercise Name 17  TS row in partial squat  -LB      Sets 17  2  -LB      Reps 17  10  -LB      Additional Comments  30#; cuing for glute/TA  -LB          Exercise 19    Exercise Name 19  SLS on blue foam  -LB      Sets 19  2  -LB      Reps 19  15  -LB      Additional Comments  extension B BTB  -LB         Exercise 20    Exercise Name 20  side planks  -LB      Cueing 20  Demo;Verbal;Tactile  -LB      Reps 20  1  -LB      Time 20  20 sec   -LB      Additional Comments  each  -LB        User Key  (r) = Recorded By, (t) = Taken By, (c) = Cosigned By    Initials Name Provider Type    LB Brianna Gross, PT Physical Therapist                       PT OP Goals     Row Name 11/04/19 1800          PT Short Term Goals    STG Date to Achieve  10/22/19  -LB     STG 1  Pt will report pain rated 2/10 at worst in order to demonstrate ability to return to normalized ADLs and functional activities.  -LB     STG 1 Progress  Ongoing  -LB     STG 1 Progress Comments  0/10 today  -LB     STG 2  Pt will be independent with initial HEP for symptom management.  -LB     STG 2 Progress  Met  -LB        Long Term Goals    LTG Date to Achieve  11/12/19  -LB     LTG 1  Pt will be independent and compliant with advanced HEP for long term management of symptoms and prevention of future occurrence.   -LB     LTG 1 Progress  Ongoing  -LB     LTG 2  Pt will be able to demonstrate hip abd and extension strength of 4/5 B in order to improve stability with recreational activities.   -LB     LTG 2 Progress  Ongoing  -LB     LTG 3  Pt will be able to return to bootcamp activities with minimal reports of pain and good form in order to decrease changes of future recurrence of symptoms.   -LB     LTG 3 Progress  Ongoing  -LB       User Key  (r) = Recorded By, (t) = Taken By, (c) = Cosigned By    Initials Name Provider Type    Brianna Chua PT Physical Therapist          Therapy Education  Education Details: continued to discuss TA activation and stabilization with all exercises   Given: Symptoms/condition management  Program: Reinforced  How Provided: Verbal  Provided to: Patient  Level of  Understanding: Teach back education performed, Verbalized, Demonstrated              Time Calculation:   Start Time: 1745  Stop Time: 1830  Time Calculation (min): 45 min  Total Timed Code Minutes- PT: 43 minute(s)  Therapy Charges for Today     Code Description Service Date Service Provider Modifiers Qty    26336228364 HC PT THER PROC EA 15 MIN 11/4/2019 Brianna Gross, PT GP 3                    Brianna Gross, PT  11/4/2019

## 2019-11-11 ENCOUNTER — APPOINTMENT (OUTPATIENT)
Dept: PHYSICAL THERAPY | Facility: HOSPITAL | Age: 34
End: 2019-11-11

## 2019-11-11 ENCOUNTER — TELEPHONE (OUTPATIENT)
Dept: FAMILY MEDICINE CLINIC | Facility: CLINIC | Age: 34
End: 2019-11-11

## 2019-11-13 ENCOUNTER — HOSPITAL ENCOUNTER (OUTPATIENT)
Dept: PHYSICAL THERAPY | Facility: HOSPITAL | Age: 34
Setting detail: THERAPIES SERIES
Discharge: HOME | End: 2019-11-13

## 2019-11-13 DIAGNOSIS — M25.552 LEFT HIP PAIN: ICD-10-CM

## 2019-11-13 DIAGNOSIS — R29.898 LEFT LEG WEAKNESS: Primary | ICD-10-CM

## 2019-11-13 PROCEDURE — 97110 THERAPEUTIC EXERCISES: CPT

## 2019-11-13 NOTE — THERAPY TREATMENT NOTE
Outpatient Physical Therapy Ortho Treatment Note  [facility]     Patient Name: Olga Szymanski  : 1985  MRN: [id number]  Today's Date: 2019      Visit Date: 2019    Visit Dx:    ICD-10-CM ICD-9-CM   1. Left leg weakness R29.898 729.89   2. Left hip pain M25.552 719.45       Patient Active Problem List   Diagnosis   • Allergic   • Asthma        Past Medical History:   Diagnosis Date   • Allergic    • Asthma    • Depression    • HPV in female 2006    followed by Gynecology        No past surgical history on file.                    PT Assessment/Plan     Row Name 19 1821          PT Assessment    Assessment Comments  Pt reporting good tolerance to additional exercises and minimal pain with inc walking distance, hill walking, and exercise. She is demonstrating improved ability to perform dynamic standing tasks on LLE equal to RLE. Encouraged pt to attend boot camp this weekend in observation mode to get a feel for what modifications may need to be made to return to class in near future.   -LB        PT Plan    PT Plan Comments  Review boot camp exercises and discuss safety and form, continue LLE stabilization exercises. Follow up concerning L shoulder and possible food relation? gall bladder?  -LB       User Key  (r) = Recorded By, (t) = Taken By, (c) = Cosigned By    Initials Name Provider Type    LB Brianna Gross, PT Physical Therapist            OP Exercises     Row Name 19 1600             Subjective Comments    Subjective Comments  I have been doing really well. I have no pain almost at all when I walk my dog even on hills and uneven ground for long distances. My L shoulder has really been bothering me.  -LB         Subjective Pain    Able to rate subjective pain?  yes  -LB      Pre-Treatment Pain Level  0  -LB         Total Minutes    36397 - PT Therapeutic Exercise Minutes  40  -LB         Exercise 1    Exercise Name 1  --  -LB      Cueing 1  --  -LB      Reps 1  --   -LB      Time 1  --  -LB         Exercise 2    Exercise Name 2  --  -LB      Cueing 2  --  -LB      Reps 2  --  -LB      Time 2  --  -LB         Exercise 3    Exercise Name 3  SL clamshell  -LB      Reps 3  15  -LB      Additional Comments  each  -LB         Exercise 4    Exercise Name 4  --  -LB      Cueing 4  --  -LB      Reps 4  --  -LB      Time 4  --  -LB         Exercise 5    Exercise Name 5  Recumbent bike  -LB      Time 5  5 min  -LB         Exercise 6    Exercise Name 6  standing hip extension  -LB      Reps 6  15  -LB      Additional Comments  each; BTB  -LB         Exercise 7    Exercise Name 7  --  -LB      Cueing 7  --  -LB      Reps 7  --  -LB         Exercise 8    Exercise Name 8  SL deadlift  -LB      Reps 8  15  -LB      Additional Comments  #3 ball  -LB         Exercise 9    Exercise Name 9  lateral walkout TS  -LB      Sets 9  4  -LB      Reps 9  5  -LB      Additional Comments  30#   -LB         Exercise 10    Exercise Name 10  seated lat pull   -LB      Sets 10  2  -LB      Reps 10  12  -LB      Additional Comments  40#   -LB         Exercise 11    Exercise Name 11  Forward planks  -LB      Cueing 11  Demo  -LB      Reps 11  2  -LB      Time 11  30 seconds  -LB         Exercise 12    Exercise Name 12  --  -LB      Reps 12  --  -LB         Exercise 13    Exercise Name 13  BOSU step ups (black side up) with RLE    -LB      Reps 13  15  -LB      Time 13  3  -LB      Additional Comments  lateral  -LB         Exercise 14    Exercise Name 14  --  -LB      Reps 14  --  -LB         Exercise 15    Exercise Name 15  seated on ball alternaing march with ball toss  -LB      Reps 15  20  -LB      Additional Comments  constant marching with intermittent tossing  -LB         Exercise 16    Exercise Name 16  mountain climber on extended elbows   -LB      Reps 16  10  -LB      Additional Comments  each leg; cuing for TA; hands on mat, feet on floor  -LB         Exercise 17    Exercise Name 17  --  -LB       Sets 17  --  -LB      Reps 17  --  -LB         Exercise 19    Exercise Name 19  SLS on blue foam  -LB      Sets 19  2  -LB      Reps 19  15  -LB      Additional Comments  extension B BTB  -LB         Exercise 20    Exercise Name 20  side planks  -LB      Cueing 20  Demo;Verbal;Tactile  -LB      Reps 20  1  -LB      Time 20  20 sec   -LB      Additional Comments  each  -LB        User Key  (r) = Recorded By, (t) = Taken By, (c) = Cosigned By    Initials Name Provider Type    LB Brianna Gross, PT Physical Therapist                       PT OP Goals     Row Name 11/13/19 1800          PT Short Term Goals    STG Date to Achieve  10/22/19  -LB     STG 1  Pt will report pain rated 2/10 at worst in order to demonstrate ability to return to normalized ADLs and functional activities.  -LB     STG 1 Progress  Met  -LB     STG 1 Progress Comments  Pt reporting minimal pain with all ADLs and household tasks.  -LB     STG 2  Pt will be independent with initial HEP for symptom management.  -LB     STG 2 Progress  Met  -LB        Long Term Goals    LTG Date to Achieve  11/12/19  -LB     LTG 1  Pt will be independent and compliant with advanced HEP for long term management of symptoms and prevention of future occurrence.   -LB     LTG 1 Progress  Ongoing  -LB     LTG 2  Pt will be able to demonstrate hip abd and extension strength of 4/5 B in order to improve stability with recreational activities.   -LB     LTG 2 Progress  Ongoing  -LB     LTG 3  Pt will be able to return to bootcamp activities with minimal reports of pain and good form in order to decrease changes of future recurrence of symptoms.   -LB     LTG 3 Progress  Ongoing  -LB     LTG 3 Progress Comments  Pt attending this weekend to assess tolerance.  -LB       User Key  (r) = Recorded By, (t) = Taken By, (c) = Cosigned By    Initials Name Provider Type    LB Brianna Gross, PT Physical Therapist          Therapy Education  Education Details: added nitin to HEP,  discussed return to [facility] with attention to modifications and assess ability to participate  Given: Symptoms/condition management, HEP  Program: Reinforced  How Provided: Verbal  Provided to: Patient  Level of Understanding: Teach back education performed, Verbalized, Demonstrated              Time Calculation:   Start Time: 1615  Stop Time: 1700  Time Calculation (min): 45 min  Total Timed Code Minutes- PT: 42 minute(s)  Therapy Charges for Today     Code Description Service Date Service Provider Modifiers Qty    29873923033 HC PT THER PROC EA 15 MIN 11/13/2019 Brianna Gross, PT GP 3                    Brianna Gross, PT  11/13/2019

## 2019-11-15 ENCOUNTER — HOSPITAL ENCOUNTER (OUTPATIENT)
Dept: GENERAL RADIOLOGY | Facility: HOSPITAL | Age: 34
Discharge: HOME | End: 2019-11-15

## 2019-11-15 ENCOUNTER — HOSPITAL ENCOUNTER (OUTPATIENT)
Dept: GENERAL RADIOLOGY | Facility: HOSPITAL | Age: 34
Discharge: HOME | End: 2019-11-15
Admitting: NURSE PRACTITIONER

## 2019-11-15 DIAGNOSIS — M54.50 ACUTE BILATERAL LOW BACK PAIN WITHOUT SCIATICA: ICD-10-CM

## 2019-11-15 PROCEDURE — 72100 X-RAY EXAM L-S SPINE 2/3 VWS: CPT

## 2019-11-15 PROCEDURE — 72040 X-RAY EXAM NECK SPINE 2-3 VW: CPT

## 2019-11-18 ENCOUNTER — APPOINTMENT (OUTPATIENT)
Dept: PHYSICAL THERAPY | Facility: HOSPITAL | Age: 34
End: 2019-11-18

## 2019-11-19 DIAGNOSIS — G89.29 CHRONIC BILATERAL LOW BACK PAIN WITHOUT SCIATICA: Primary | ICD-10-CM

## 2019-11-19 DIAGNOSIS — M54.50 CHRONIC BILATERAL LOW BACK PAIN WITHOUT SCIATICA: Primary | ICD-10-CM

## 2019-11-21 ENCOUNTER — HOSPITAL ENCOUNTER (OUTPATIENT)
Dept: PHYSICAL THERAPY | Facility: HOSPITAL | Age: 34
Setting detail: THERAPIES SERIES
Discharge: HOME | End: 2019-11-21

## 2019-11-21 DIAGNOSIS — M25.552 LEFT HIP PAIN: ICD-10-CM

## 2019-11-21 DIAGNOSIS — R29.898 LEFT LEG WEAKNESS: Primary | ICD-10-CM

## 2019-11-21 PROCEDURE — 97110 THERAPEUTIC EXERCISES: CPT

## 2019-11-21 NOTE — THERAPY DISCHARGE NOTE
Outpatient Physical Therapy Ortho Treatment Note/Discharge Summary  [facility]     Patient Name: Olga Szymanski  : 1985  MRN: [id number]  Today's Date: 2019      Visit Date: 2019    Visit Dx:    ICD-10-CM ICD-9-CM   1. Left leg weakness R29.898 729.89   2. Left hip pain M25.552 719.45       Patient Active Problem List   Diagnosis   • Allergic   • Asthma        Past Medical History:   Diagnosis Date   • Allergic    • Asthma    • Depression    • HPV in female     followed by Gynecology        No past surgical history on file.                    PT Assessment/Plan     Row Name 19 0980          PT Assessment    Assessment Comments  Pt has participated in 9 skilled PT sessions to address L hip pain and weakness. She progressed well with skilled PT reporting 0/10 pain with recreational walking, ADLs, and household tasks. She has returned to [employer] class x 1 this week and tolerated well. She understands advanced HEP to continue to improve core/hip stability and has developed good awareness of body mechanics and core activation. She is appropriate for d/c today to attempt independent management.   -LB        PT Plan    PT Plan Comments  d/c to independent management   -LB       User Key  (r) = Recorded By, (t) = Taken By, (c) = Cosigned By    Initials Name Provider Type    Brianna Chua, PT Physical Therapist              OP Exercises     Row Name 19 0900             Subjective Comments    Subjective Comments  I went to [employer] and did 30 minutes of it and I was careful but I did pretty well. I just feel tight today. No pain.  -LB         Subjective Pain    Able to rate subjective pain?  yes  -LB      Pre-Treatment Pain Level  0  -LB         Total Minutes    61572 - PT Therapeutic Exercise Minutes  30  -LB         Exercise 1    Exercise Name 1  Piriformis stretch (reach through)  -LB      Reps 1  3  -LB      Time 1  20  -LB         Exercise 2    Exercise  Name 2  90/90 HS stretch with ankle pump  -LB      Reps 2  10  -LB      Time 2  10 pumps  -LB         Exercise 4    Exercise Name 4  DKTC with green ball with TA  -LB      Cueing 4  Verbal  -LB      Reps 4  10  -LB      Time 4  10  -LB         Exercise 5    Exercise Name 5  Recumbent bike  -LB      Time 5  5 minutes  -LB      Additional Comments  Level 2  -LB         Exercise 10    Exercise Name 10  seated lat pull   -LB      Sets 10  3  -LB      Reps 10  10  -LB      Additional Comments  Black TB  -LB         Exercise 11    Exercise Name 11  Forward planks  -LB      Cueing 11  Verbal  -LB      Reps 11  2   -LB      Time 11  35 seconds  -LB         Exercise 12    Exercise Name 12  Qped bird/dog  -LB      Reps 12  10  -LB         Exercise 15    Exercise Name 15  seated on ball alternaing march with ball toss  -LB      Reps 15  20  -LB         Exercise 19    Exercise Name 19  SLS on blue foam  -LB      Sets 19  2  -LB      Reps 19  15  -LB      Additional Comments  Black TB  -LB         Exercise 20    Exercise Name 20  side planks  -LB      Reps 20  2  -LB      Time 20  25 seconds each  -LB        User Key  (r) = Recorded By, (t) = Taken By, (c) = Cosigned By    Initials Name Provider Type    Brianna Chua, PT Physical Therapist                         PT OP Goals     Row Name 11/21/19 0900          PT Short Term Goals    STG Date to Achieve  10/22/19  -LB     STG 1  Pt will report pain rated 2/10 at worst in order to demonstrate ability to return to normalized ADLs and functional activities.  -LB     STG 1 Progress  Met  -LB     STG 2  Pt will be independent with initial HEP for symptom management.  -LB     STG 2 Progress  Met  -LB        Long Term Goals    LTG Date to Achieve  11/12/19  -LB     LTG 1  Pt will be independent and compliant with advanced HEP for long term management of symptoms and prevention of future occurrence.   -LB     LTG 1 Progress  Met  -LB     LTG 2  Pt will be able to demonstrate hip abd  and extension strength of 4/5 B in order to improve stability with recreational activities.   -LB     LTG 2 Progress  Met  -LB     LTG 3  Pt will be able to [place] with minimal reports of pain and good form in order to decrease changes of future recurrence of symptoms.   -LB     LTG 3 Progress  Partially Met  -LB     LTG 3 Progress Comments  Pt has participated in 50% of boot camp activities without inc L hip pain.  -LB       User Key  (r) = Recorded By, (t) = Taken By, (c) = Cosigned By    Initials Name Provider Type    Brianna Chua, PT Physical Therapist          Therapy Education  Education Details: discussed slow return to full boot camp activity, continued self monitoring for form, focus on TA stabilization with all movements, continued HEP 2x/week mixed in with boot camp as appropriate  Given: Symptoms/condition management, HEP, Mobility training  Program: Reinforced  How Provided: Verbal  Provided to: Patient  Level of Understanding: Teach back education performed, Verbalized, Demonstrated              Time Calculation:   Start Time: 0900  Stop Time: 0935  Time Calculation (min): 35 min  Total Timed Code Minutes- PT: 33 minute(s)  Therapy Charges for Today     Code Description Service Date Service Provider Modifiers Qty    60476456403 HC PT THER PROC EA 15 MIN 11/21/2019 Brianna Gross, PT GP 2                OP PT Discharge Summary  Date of Discharge: 11/21/19  Reason for Discharge: All goals achieved, Independent  Outcomes Achieved: Patient able to partially acheive established goals  Discharge Destination: Home with home program  Discharge Instructions/Additional Comments: see assessment       Brianna Gross PT  11/21/2019

## 2019-11-26 ENCOUNTER — TELEPHONE (OUTPATIENT)
Dept: FAMILY MEDICINE CLINIC | Facility: CLINIC | Age: 34
End: 2019-11-26

## 2019-11-26 NOTE — TELEPHONE ENCOUNTER
Pt called and she is set up with ortho for her neck and shoulder and she was informed that she might want to do an MRI before the end of the year as she has met her deductible. And if she waits to do it they said it might not be until the beginning of the new year

## 2019-11-27 NOTE — TELEPHONE ENCOUNTER
Okay will tell her I would like for her to see them first and if they want to order it they can they may not order one

## 2020-01-06 ENCOUNTER — OFFICE VISIT (OUTPATIENT)
Dept: FAMILY MEDICINE CLINIC | Facility: CLINIC | Age: 35
End: 2020-01-06

## 2020-01-06 VITALS
TEMPERATURE: 100.3 F | DIASTOLIC BLOOD PRESSURE: 82 MMHG | HEART RATE: 96 BPM | WEIGHT: 196.3 LBS | OXYGEN SATURATION: 98 % | RESPIRATION RATE: 14 BRPM | SYSTOLIC BLOOD PRESSURE: 122 MMHG | BODY MASS INDEX: 31.55 KG/M2 | HEIGHT: 66 IN

## 2020-01-06 DIAGNOSIS — J11.1 FLU: Primary | ICD-10-CM

## 2020-01-06 LAB
EXPIRATION DATE: ABNORMAL
FLUAV AG NPH QL: POSITIVE
FLUBV AG NPH QL: NEGATIVE
INTERNAL CONTROL: ABNORMAL
Lab: ABNORMAL

## 2020-01-06 PROCEDURE — 99213 OFFICE O/P EST LOW 20 MIN: CPT | Performed by: NURSE PRACTITIONER

## 2020-01-06 PROCEDURE — 87804 INFLUENZA ASSAY W/OPTIC: CPT | Performed by: NURSE PRACTITIONER

## 2020-01-06 RX ORDER — ALBUTEROL SULFATE 90 UG/1
2 AEROSOL, METERED RESPIRATORY (INHALATION) EVERY 4 HOURS PRN
Qty: 1 INHALER | Refills: 0 | Status: SHIPPED | OUTPATIENT
Start: 2020-01-06 | End: 2021-11-09

## 2020-01-06 RX ORDER — LETROZOLE 2.5 MG/1
TABLET, FILM COATED ORAL
COMMUNITY
Start: 2019-12-20 | End: 2021-01-16 | Stop reason: HOSPADM

## 2020-01-06 RX ORDER — OSELTAMIVIR PHOSPHATE 75 MG/1
75 CAPSULE ORAL 2 TIMES DAILY
Qty: 10 CAPSULE | Refills: 0 | Status: SHIPPED | OUTPATIENT
Start: 2020-01-06 | End: 2020-01-11

## 2020-01-06 NOTE — PROGRESS NOTES
Subjective   Olga Szymanski is a 34 y.o. female. She is a patient of JOEL Mccullough, but is new to me.     History of Present Illness   Patient here for cough,  Congestion, body aches and fever that started yesterday. Pt has taken theraflu and nyquil flu and advil for her symptoms.       The following portions of the patient's history were reviewed and updated as appropriate: allergies, current medications, past family history, past medical history, past social history, past surgical history and problem list.    Review of Systems   Constitutional: Positive for fatigue and fever. Negative for appetite change and chills.   HENT: Positive for congestion, nosebleeds, postnasal drip, sinus pressure and sore throat. Negative for ear pain, rhinorrhea and sneezing.    Eyes: Negative for redness and itching.   Respiratory: Positive for cough, shortness of breath and wheezing. Negative for chest tightness.    Cardiovascular: Negative for chest pain, palpitations and leg swelling.   Musculoskeletal: Negative for myalgias.   Allergic/Immunologic: Negative.    Neurological: Positive for headache. Negative for dizziness.       Objective   Physical Exam   Constitutional: She is oriented to person, place, and time. She appears well-developed and well-nourished.   HENT:   Head: Normocephalic and atraumatic.   Right Ear: External ear and ear canal normal. A middle ear effusion is present.   Left Ear: External ear and ear canal normal. A middle ear effusion is present.   Nose: Rhinorrhea present. Right sinus exhibits no maxillary sinus tenderness and no frontal sinus tenderness. Left sinus exhibits no maxillary sinus tenderness and no frontal sinus tenderness.   Mouth/Throat: Uvula is midline and mucous membranes are normal. Oropharyngeal exudate, posterior oropharyngeal edema and posterior oropharyngeal erythema present. No tonsillar abscesses. Tonsils are 1+ on the right. Tonsils are 1+ on the left.   Eyes: Pupils are  "equal, round, and reactive to light. Conjunctivae and EOM are normal. Right eye exhibits no discharge. Left eye exhibits no discharge.   Neck: Normal range of motion. Neck supple. No thyromegaly present.   Cardiovascular: Normal rate, regular rhythm, normal heart sounds and intact distal pulses.   No murmur heard.  Pulmonary/Chest: Effort normal and breath sounds normal. No tachypnea. No respiratory distress. She has no decreased breath sounds. She has no wheezes. She has no rales.   Lymphadenopathy:     She has no cervical adenopathy.   Neurological: She is alert and oriented to person, place, and time.   Skin: Skin is warm and dry.   Psychiatric: She has a normal mood and affect. Her behavior is normal. Judgment and thought content normal.   Nursing note and vitals reviewed.      Vitals:    01/06/20 1259   BP: 122/82   Pulse: 96   Resp: 14   Temp: 100.3 °F (37.9 °C)   SpO2: 98%     Body mass index is 31.68 kg/m².    Procedures    Assessment/Plan   Problems Addressed this Visit     None      Visit Diagnoses     Flu    -  Primary    Relevant Medications    albuterol sulfate  (90 Base) MCG/ACT inhaler    oseltamivir (TAMIFLU) 75 MG capsule        Sent Tamiflu prophylaxis for  per patient's request.     Return if symptoms worsen or fail to improve.              Patient Instructions   Return if symptoms worsen or fail to improve.    Influenza, Adult  Influenza, more commonly known as \"the flu,\" is a viral infection that mainly affects the respiratory tract. The respiratory tract includes organs that help you breathe, such as the lungs, nose, and throat. The flu causes many symptoms similar to the common cold along with high fever and body aches.  The flu spreads easily from person to person (is contagious). Getting a flu shot (influenza vaccination) every year is the best way to prevent the flu.  What are the causes?  This condition is caused by the influenza virus. You can get the virus by:  · Breathing " in droplets that are in the air from an infected person's cough or sneeze.  · Touching something that has been exposed to the virus (has been contaminated) and then touching your mouth, nose, or eyes.  What increases the risk?  The following factors may make you more likely to get the flu:  · Not washing or sanitizing your hands often.  · Having close contact with many people during cold and flu season.  · Touching your mouth, eyes, or nose without first washing or sanitizing your hands.  · Not getting a yearly (annual) flu shot.  You may have a higher risk for the flu, including serious problems such as a lung infection (pneumonia), if you:  · Are older than 65.  · Are pregnant.  · Have a weakened disease-fighting system (immune system). You may have a weakened immune system if you:  ? Have HIV or AIDS.  ? Are undergoing chemotherapy.  ? Are taking medicines that reduce (suppress) the activity of your immune system.  · Have a long-term (chronic) illness, such as heart disease, kidney disease, diabetes, or lung disease.  · Have a liver disorder.  · Are severely overweight (morbidly obese).  · Have anemia. This is a condition that affects your red blood cells.  · Have asthma.  What are the signs or symptoms?  Symptoms of this condition usually begin suddenly and last 4-14 days. They may include:  · Fever and chills.  · Headaches, body aches, or muscle aches.  · Sore throat.  · Cough.  · Runny or stuffy (congested) nose.  · Chest discomfort.  · Poor appetite.  · Weakness or fatigue.  · Dizziness.  · Nausea or vomiting.  How is this diagnosed?  This condition may be diagnosed based on:  · Your symptoms and medical history.  · A physical exam.  · Swabbing your nose or throat and testing the fluid for the influenza virus.  How is this treated?  If the flu is diagnosed early, you can be treated with medicine that can help reduce how severe the illness is and how long it lasts (antiviral medicine). This may be given by  mouth (orally) or through an IV.  Taking care of yourself at home can help relieve symptoms. Your health care provider may recommend:  · Taking over-the-counter medicines.  · Drinking plenty of fluids.  In many cases, the flu goes away on its own. If you have severe symptoms or complications, you may be treated in a hospital.  Follow these instructions at home:  Activity  · Rest as needed and get plenty of sleep.  · Stay home from work or school as told by your health care provider. Unless you are visiting your health care provider, avoid leaving home until your fever has been gone for 24 hours without taking medicine.  Eating and drinking  · Take an oral rehydration solution (ORS). This is a drink that is sold at pharmacies and retail stores.  · Drink enough fluid to keep your urine pale yellow.  · Drink clear fluids in small amounts as you are able. Clear fluids include water, ice chips, diluted fruit juice, and low-calorie sports drinks.  · Eat bland, easy-to-digest foods in small amounts as you are able. These foods include bananas, applesauce, rice, lean meats, toast, and crackers.  · Avoid drinking fluids that contain a lot of sugar or caffeine, such as energy drinks, regular sports drinks, and soda.  · Avoid alcohol.  · Avoid spicy or fatty foods.  General instructions         · Take over-the-counter and prescription medicines only as told by your health care provider.  · Use a cool mist humidifier to add humidity to the air in your home. This can make it easier to breathe.  · Cover your mouth and nose when you cough or sneeze.  · Wash your hands with soap and water often, especially after you cough or sneeze. If soap and water are not available, use alcohol-based hand .  · Keep all follow-up visits as told by your health care provider. This is important.  How is this prevented?    · Get an annual flu shot. You may get the flu shot in late summer, fall, or winter. Ask your health care provider when  "you should get your flu shot.  · Avoid contact with people who are sick during cold and flu season. This is generally fall and winter.  Contact a health care provider if:  · You develop new symptoms.  · You have:  ? Chest pain.  ? Diarrhea.  ? A fever.  · Your cough gets worse.  · You produce more mucus.  · You feel nauseous or you vomit.  Get help right away if:  · You develop shortness of breath or difficulty breathing.  · Your skin or nails turn a bluish color.  · You have severe pain or stiffness in your neck.  · You develop a sudden headache or sudden pain in your face or ear.  · You cannot eat or drink without vomiting.  Summary  · Influenza, more commonly known as \"the flu,\" is a viral infection that primarily affects your respiratory tract.  · Symptoms of the flu usually begin suddenly and last 4-14 days.  · Getting an annual flu shot is the best way to prevent getting the flu.  · Stay home from work or school as told by your health care provider. Unless you are visiting your health care provider, avoid leaving home until your fever has been gone for 24 hours without taking medicine.  · Keep all follow-up visits as told by your health care provider. This is important.  This information is not intended to replace advice given to you by your health care provider. Make sure you discuss any questions you have with your health care provider.  Document Released: 12/15/2001 Document Revised: 06/05/2019 Document Reviewed: 06/05/2019  [employer] Interactive Patient Education © 2019 [employer]        "

## 2020-01-06 NOTE — PATIENT INSTRUCTIONS
"Return if symptoms worsen or fail to improve.    Influenza, Adult  Influenza, more commonly known as \"the flu,\" is a viral infection that mainly affects the respiratory tract. The respiratory tract includes organs that help you breathe, such as the lungs, nose, and throat. The flu causes many symptoms similar to the common cold along with high fever and body aches.  The flu spreads easily from person to person (is contagious). Getting a flu shot (influenza vaccination) every year is the best way to prevent the flu.  What are the causes?  This condition is caused by the influenza virus. You can get the virus by:  · Breathing in droplets that are in the air from an infected person's cough or sneeze.  · Touching something that has been exposed to the virus (has been contaminated) and then touching your mouth, nose, or eyes.  What increases the risk?  The following factors may make you more likely to get the flu:  · Not washing or sanitizing your hands often.  · Having close contact with many people during cold and flu season.  · Touching your mouth, eyes, or nose without first washing or sanitizing your hands.  · Not getting a yearly (annual) flu shot.  You may have a higher risk for the flu, including serious problems such as a lung infection (pneumonia), if you:  · Are older than 65.  · Are pregnant.  · Have a weakened disease-fighting system (immune system). You may have a weakened immune system if you:  ? Have HIV or AIDS.  ? Are undergoing chemotherapy.  ? Are taking medicines that reduce (suppress) the activity of your immune system.  · Have a long-term (chronic) illness, such as heart disease, kidney disease, diabetes, or lung disease.  · Have a liver disorder.  · Are severely overweight (morbidly obese).  · Have anemia. This is a condition that affects your red blood cells.  · Have asthma.  What are the signs or symptoms?  Symptoms of this condition usually begin suddenly and last 4-14 days. They may " include:  · Fever and chills.  · Headaches, body aches, or muscle aches.  · Sore throat.  · Cough.  · Runny or stuffy (congested) nose.  · Chest discomfort.  · Poor appetite.  · Weakness or fatigue.  · Dizziness.  · Nausea or vomiting.  How is this diagnosed?  This condition may be diagnosed based on:  · Your symptoms and medical history.  · A physical exam.  · Swabbing your nose or throat and testing the fluid for the influenza virus.  How is this treated?  If the flu is diagnosed early, you can be treated with medicine that can help reduce how severe the illness is and how long it lasts (antiviral medicine). This may be given by mouth (orally) or through an IV.  Taking care of yourself at home can help relieve symptoms. Your health care provider may recommend:  · Taking over-the-counter medicines.  · Drinking plenty of fluids.  In many cases, the flu goes away on its own. If you have severe symptoms or complications, you may be treated in a hospital.  Follow these instructions at home:  Activity  · Rest as needed and get plenty of sleep.  · Stay home from work or school as told by your health care provider. Unless you are visiting your health care provider, avoid leaving home until your fever has been gone for 24 hours without taking medicine.  Eating and drinking  · Take an oral rehydration solution (ORS). This is a drink that is sold at pharmacies and retail stores.  · Drink enough fluid to keep your urine pale yellow.  · Drink clear fluids in small amounts as you are able. Clear fluids include water, ice chips, diluted fruit juice, and low-calorie sports drinks.  · Eat bland, easy-to-digest foods in small amounts as you are able. These foods include bananas, applesauce, rice, lean meats, toast, and crackers.  · Avoid drinking fluids that contain a lot of sugar or caffeine, such as energy drinks, regular sports drinks, and soda.  · Avoid alcohol.  · Avoid spicy or fatty foods.  General instructions      "    · Take over-the-counter and prescription medicines only as told by your health care provider.  · Use a cool mist humidifier to add humidity to the air in your home. This can make it easier to breathe.  · Cover your mouth and nose when you cough or sneeze.  · Wash your hands with soap and water often, especially after you cough or sneeze. If soap and water are not available, use alcohol-based hand .  · Keep all follow-up visits as told by your health care provider. This is important.  How is this prevented?    · Get an annual flu shot. You may get the flu shot in late summer, fall, or winter. Ask your health care provider when you should get your flu shot.  · Avoid contact with people who are sick during cold and flu season. This is generally fall and winter.  Contact a health care provider if:  · You develop new symptoms.  · You have:  ? Chest pain.  ? Diarrhea.  ? A fever.  · Your cough gets worse.  · You produce more mucus.  · You feel nauseous or you vomit.  Get help right away if:  · You develop shortness of breath or difficulty breathing.  · Your skin or nails turn a bluish color.  · You have severe pain or stiffness in your neck.  · You develop a sudden headache or sudden pain in your face or ear.  · You cannot eat or drink without vomiting.  Summary  · Influenza, more commonly known as \"the flu,\" is a viral infection that primarily affects your respiratory tract.  · Symptoms of the flu usually begin suddenly and last 4-14 days.  · Getting an annual flu shot is the best way to prevent getting the flu.  · Stay home from work or school as told by your health care provider. Unless you are visiting your health care provider, avoid leaving home until your fever has been gone for 24 hours without taking medicine.  · Keep all follow-up visits as told by your health care provider. This is important.  This information is not intended to replace advice given to you by your health care provider. Make sure " you discuss any questions you have with your health care provider.  Document Released: 12/15/2001 Document Revised: 06/05/2019 Document Reviewed: 06/05/2019  [employer] Interactive Patient Education © 2019 [employer]

## 2020-05-07 ENCOUNTER — TELEMEDICINE (OUTPATIENT)
Dept: FAMILY MEDICINE CLINIC | Facility: CLINIC | Age: 35
End: 2020-05-07

## 2020-05-07 DIAGNOSIS — R10.10 PAIN OF UPPER ABDOMEN: Primary | ICD-10-CM

## 2020-05-07 PROCEDURE — 99213 OFFICE O/P EST LOW 20 MIN: CPT | Performed by: NURSE PRACTITIONER

## 2020-05-07 RX ORDER — NITROFURANTOIN 25; 75 MG/1; MG/1
100 CAPSULE ORAL 2 TIMES DAILY
Qty: 14 CAPSULE | Refills: 0 | Status: ON HOLD | OUTPATIENT
Start: 2020-05-07 | End: 2021-01-11

## 2020-05-07 NOTE — PROGRESS NOTES
Subjective left sided  And left groin pain  Olga Szymanski is a 34 y.o. female.     History of Present Illness video visit.  Has some left sided upper abdomen pain the radiated down into her groin. I started a while back but has continued intermittently to bother her, She is concerned and would like something done. No fever,vomiting or diarrhea.  She reports no dietary changes or constipation.  The following portions of the patient's history were reviewed and updated as appropriate: allergies, current medications, past family history, past medical history, past social history, past surgical history and problem list.    Review of Systems   Constitutional: Negative for activity change, appetite change and fever.   Respiratory: Negative for cough and shortness of breath.    Cardiovascular: Negative for chest pain and leg swelling.   Gastrointestinal: Positive for abdominal pain. Negative for constipation, diarrhea, GERD and indigestion.   Skin: Negative for rash.       Objective   Physical Exam   Constitutional: She is oriented to person, place, and time. She appears well-developed and well-nourished.   HENT:   Head: Normocephalic and atraumatic.   Eyes: Pupils are equal, round, and reactive to light. EOM are normal.   Pulmonary/Chest: Effort normal.   Abdominal: There is tenderness.   Reports she has tenderness to her left upper quadrant that radiates down to her groin area.  The pain lasts for seconds and she describes it as sharp and stabbing.   Musculoskeletal: Normal range of motion.   Neurological: She is alert and oriented to person, place, and time.   Skin: Skin is warm and dry.   Psychiatric: She has a normal mood and affect.   Nursing note and vitals reviewed.        Assessment/Plan   Olga was seen today for abdominal pain.    Diagnoses and all orders for this visit:    Pain of upper abdomen  -     CT Abdomen Pelvis With Contrast; Future    Other orders  -     nitrofurantoin, macrocrystal-monohydrate,  (Macrobid) 100 MG capsule; Take 1 capsule by mouth 2 (Two) Times a Day.      The use of a video visit has been reviewed with the patient and verbal informed consent has been obtained.  Spent 15 minutes via video visit with this patient.  We are going to order an abdominal CT and will go from there.

## 2020-05-08 ENCOUNTER — TRANSCRIBE ORDERS (OUTPATIENT)
Dept: ADMINISTRATIVE | Facility: HOSPITAL | Age: 35
End: 2020-05-08

## 2020-05-08 DIAGNOSIS — Z12.31 VISIT FOR SCREENING MAMMOGRAM: Primary | ICD-10-CM

## 2020-05-26 ENCOUNTER — TELEPHONE (OUTPATIENT)
Dept: FAMILY MEDICINE CLINIC | Facility: CLINIC | Age: 35
End: 2020-05-26

## 2020-05-26 NOTE — TELEPHONE ENCOUNTER
PT CALLED SAYING THAT SHE IS SCHEDULED TO HAVE A CT OF HER ABDOMEN PELVIS WITH CONTRAST TOMORROW AT 1:30 PM, HOWEVER SHE HAS 2 POSITIVE HOME PREGNANCY TEST OVER THE WEEKEND AND WOULD LIKE TO KNOW WHAT SHE SHOULD DO. PLEASE CALL PT AND ADVISE. PT WOULD LIKE TO SPEAK TO ILANA PERSONALLY ABOUT THIS.    CALL BACK # [phone]

## 2020-05-26 NOTE — TELEPHONE ENCOUNTER
Called pt and she is going to forego the CT for now and get set up with a OB/GYN and call us back if she has problems

## 2020-05-27 ENCOUNTER — APPOINTMENT (OUTPATIENT)
Dept: CT IMAGING | Facility: HOSPITAL | Age: 35
End: 2020-05-27

## 2020-06-23 ENCOUNTER — APPOINTMENT (OUTPATIENT)
Dept: MAMMOGRAPHY | Facility: HOSPITAL | Age: 35
End: 2020-06-23

## 2020-06-26 ENCOUNTER — TELEPHONE (OUTPATIENT)
Dept: FAMILY MEDICINE CLINIC | Facility: CLINIC | Age: 35
End: 2020-06-26

## 2020-06-26 NOTE — TELEPHONE ENCOUNTER
Patient called in stating she was exposed to someone who may have covid. She also states that she's pregnant and would like information on testing.    Please call back to brianna @ [phone]

## 2020-07-07 LAB
EXTERNAL HEPATITIS B SURFACE ANTIGEN: NEGATIVE
EXTERNAL HEPATITIS C AB: NORMAL
EXTERNAL RUBELLA QUALITATIVE: NORMAL
EXTERNAL SYPHILIS RPR SCREEN: NORMAL
HIV1 P24 AG SERPL QL IA: NORMAL

## 2020-09-15 ENCOUNTER — OFFICE VISIT (OUTPATIENT)
Dept: FAMILY MEDICINE CLINIC | Facility: CLINIC | Age: 35
End: 2020-09-15

## 2020-09-15 VITALS
RESPIRATION RATE: 16 BRPM | BODY MASS INDEX: 32.47 KG/M2 | SYSTOLIC BLOOD PRESSURE: 110 MMHG | TEMPERATURE: 96.1 F | DIASTOLIC BLOOD PRESSURE: 82 MMHG | OXYGEN SATURATION: 99 % | HEART RATE: 75 BPM | HEIGHT: 66 IN | WEIGHT: 202 LBS

## 2020-09-15 DIAGNOSIS — M54.50 LOW BACK PAIN, UNSPECIFIED BACK PAIN LATERALITY, UNSPECIFIED CHRONICITY, UNSPECIFIED WHETHER SCIATICA PRESENT: Primary | ICD-10-CM

## 2020-09-15 DIAGNOSIS — Z23 NEED FOR INFLUENZA VACCINATION: ICD-10-CM

## 2020-09-15 LAB
BILIRUB BLD-MCNC: NEGATIVE MG/DL
CLARITY, POC: CLEAR
COLOR UR: YELLOW
GLUCOSE UR STRIP-MCNC: NEGATIVE MG/DL
KETONES UR QL: NEGATIVE
LEUKOCYTE EST, POC: NEGATIVE
NITRITE UR-MCNC: NEGATIVE MG/ML
PH UR: 8 [PH] (ref 5–8)
PROT UR STRIP-MCNC: ABNORMAL MG/DL
RBC # UR STRIP: NEGATIVE /UL
SP GR UR: 1.01 (ref 1–1.03)
UROBILINOGEN UR QL: NORMAL

## 2020-09-15 PROCEDURE — 90686 IIV4 VACC NO PRSV 0.5 ML IM: CPT | Performed by: NURSE PRACTITIONER

## 2020-09-15 PROCEDURE — 90471 IMMUNIZATION ADMIN: CPT | Performed by: NURSE PRACTITIONER

## 2020-09-15 PROCEDURE — 81003 URINALYSIS AUTO W/O SCOPE: CPT | Performed by: NURSE PRACTITIONER

## 2020-09-15 PROCEDURE — 99213 OFFICE O/P EST LOW 20 MIN: CPT | Performed by: NURSE PRACTITIONER

## 2020-09-15 NOTE — PATIENT INSTRUCTIONS
Muscle Strain  A muscle strain is an injury that occurs when a muscle is stretched beyond its normal length. Usually, a small number of muscle fibers are torn when this happens. There are three types of muscle strains. First-degree strains have the least amount of muscle fiber tearing and the least amount of pain. Second-degree and third-degree strains have more tearing and pain.  Usually, recovery from muscle strain takes 1-2 weeks. Complete healing normally takes 5-6 weeks.  What are the causes?  This condition is caused when a sudden, violent force is placed on a muscle and stretches it too far. This may occur with a fall, lifting, or sports.  What increases the risk?  This condition is more likely to develop in athletes and people who are physically active.  What are the signs or symptoms?  Symptoms of this condition include:  · Pain.  · Bruising.  · Swelling.  · Trouble using the muscle.  How is this diagnosed?  This condition is diagnosed based on a physical exam and your medical history. Tests may also be done, including an X-ray, ultrasound, or MRI.  How is this treated?  This condition is initially treated with PRICE therapy. This therapy involves:  · Protecting the muscle from being injured again.  · Resting the injured muscle.  · Icing the injured muscle.  · Applying pressure (compression) to the injured muscle. This may be done with a splint or elastic bandage.  · Raising (elevating) the injured muscle.  Your health care provider may also recommend medicine for pain.  Follow these instructions at home:  If you have a splint:  · Wear the splint as told by your health care provider. Remove it only as told by your health care provider.  · Loosen the splint if your fingers or toes tingle, become numb, or turn cold and blue.  · Keep the splint clean.  · If the splint is not waterproof:  ? Do not let it get wet.  ? Cover it with a watertight covering when you take a bath or a shower.  Managing pain, stiffness,  and swelling    · If directed, put ice on the injured area.  ? If you have a removable splint, remove it as told by your health care provider.  ? Put ice in a plastic bag.  ? Place a towel between your skin and the bag.  ? Leave the ice on for 20 minutes, 2-3 times a day.  · Move your fingers or toes often to avoid stiffness and to lessen swelling.  · Raise (elevate) the injured area above the level of your heart while you are sitting or lying down.  · Wear an elastic bandage as told by your health care provider. Make sure that it is not too tight.  General instructions  · Take over-the-counter and prescription medicines only as told by your health care provider.  · Restrict your activity and rest the injured muscle as told by your health care provider. Gentle movements may be allowed.  · If physical therapy was prescribed, do exercises as told by your health care provider.  · Do not put pressure on any part of the splint until it is fully hardened. This may take several hours.  · Do not use any products that contain nicotine or tobacco, such as cigarettes and e-cigarettes. These can delay bone healing. If you need help quitting, ask your health care provider.  · Ask your health care provider when it is safe to drive if you have a splint.  · Keep all follow-up visits as told by your health care provider. This is important.  How is this prevented?  · Warm up before exercising. This helps to prevent future muscle strains.  Contact a health care provider if:  · You have more pain or swelling in the injured area.  Get help right away if:  · You have numbness or tingling or lose a lot of strength in the injured area.  Summary  · A muscle strain is an injury that occurs when a muscle is stretched beyond its normal length.  · This condition is caused when a sudden, violent force is placed on a muscle and stretches it too far.  · This condition is initially treated with PRICE therapy, which involves protecting, resting,  icing, compressing, and elevating.  · Gentle movements may be allowed. If physical therapy was prescribed, do exercises as told by your health care provider.  This information is not intended to replace advice given to you by your health care provider. Make sure you discuss any questions you have with your health care provider.  Document Released: 12/18/2006 Document Revised: 11/30/2018 Document Reviewed: 01/24/2018  Elsevier Patient Education © 2020 [employer]

## 2020-09-15 NOTE — PROGRESS NOTES
Subjective   Olga Szymanski is a 34 y.o. female.   Back Pain    History of Present Illness   Back pain that  Started 5 days ago. She turned over on her left side and the next day she had some burning sensation to her left mid paraspinous pain.  Describes it as a sharp pain  Did not use any heat or tylenol.    The following portions of the patient's history were reviewed and updated as appropriate: allergies, current medications, past family history, past medical history, past social history, past surgical history and problem list.    Review of Systems    Objective   Physical Exam      Assessment/Plan   Problem List Items Addressed This Visit     None      Visit Diagnoses     Need for influenza vaccination    -  Primary    Relevant Orders    FluLaval Quad >6 Months ([id number]) (Completed)    Low back pain, unspecified back pain laterality, unspecified chronicity, unspecified whether sciatica present        Relevant Orders    POCT urinalysis dipstick, automated (Completed)        Tylenol and heat for comfort as directed       No follow-ups on file.

## 2020-11-16 ENCOUNTER — TELEPHONE (OUTPATIENT)
Dept: FAMILY MEDICINE CLINIC | Facility: CLINIC | Age: 35
End: 2020-11-16

## 2020-11-16 NOTE — TELEPHONE ENCOUNTER
PATIENT IS CALLING BECAUSE HER  TESTED POSITIVE FOR COVID-19    PATIENT IS PREGNANT AND  HAS TESTED NEGATIVE BUT HAD SOME QUESTION ABOUT QUARANTINE AND SYMPTOMS               PLEASE CONTACT PATIENT @[phone]

## 2020-12-31 LAB — EXTERNAL GROUP B STREP ANTIGEN: NEGATIVE

## 2021-01-11 ENCOUNTER — HOSPITAL ENCOUNTER (OUTPATIENT)
Facility: HOSPITAL | Age: 36
Setting detail: OBSERVATION
Discharge: HOME | End: 2021-01-11
Attending: OBSTETRICS & GYNECOLOGY | Admitting: OBSTETRICS & GYNECOLOGY

## 2021-01-11 VITALS
HEART RATE: 69 BPM | BODY MASS INDEX: 36.57 KG/M2 | RESPIRATION RATE: 16 BRPM | TEMPERATURE: 98.2 F | DIASTOLIC BLOOD PRESSURE: 90 MMHG | WEIGHT: 233 LBS | HEIGHT: 67 IN | SYSTOLIC BLOOD PRESSURE: 151 MMHG

## 2021-01-11 DIAGNOSIS — Z3A.37 37 WEEKS GESTATION OF PREGNANCY: Primary | ICD-10-CM

## 2021-01-11 PROBLEM — Z34.90 PREGNANCY: Status: ACTIVE | Noted: 2021-01-11

## 2021-01-11 LAB
ALBUMIN SERPL-MCNC: 3.6 G/DL (ref 3.5–5.2)
ALBUMIN/GLOB SERPL: 1.2 G/DL
ALP SERPL-CCNC: 111 U/L (ref 39–117)
ALT SERPL W P-5'-P-CCNC: 15 U/L (ref 1–33)
ANION GAP SERPL CALCULATED.3IONS-SCNC: 12 MMOL/L (ref 5–15)
AST SERPL-CCNC: 19 U/L (ref 1–32)
BASOPHILS # BLD AUTO: 0.05 10*3/MM3 (ref 0–0.2)
BASOPHILS NFR BLD AUTO: 0.5 % (ref 0–1.5)
BILIRUB SERPL-MCNC: 0.3 MG/DL (ref 0–1.2)
BILIRUB UR QL STRIP: NEGATIVE
BUN SERPL-MCNC: 8 MG/DL (ref 6–20)
BUN/CREAT SERPL: 11.1 (ref 7–25)
CALCIUM SPEC-SCNC: 9 MG/DL (ref 8.6–10.5)
CHLORIDE SERPL-SCNC: 101 MMOL/L (ref 98–107)
CLARITY UR: CLEAR
CO2 SERPL-SCNC: 22 MMOL/L (ref 22–29)
COLOR UR: YELLOW
CREAT SERPL-MCNC: 0.72 MG/DL (ref 0.57–1)
CREAT UR-MCNC: 19.3 MG/DL
DEPRECATED RDW RBC AUTO: 44.6 FL (ref 37–54)
EOSINOPHIL # BLD AUTO: 0.1 10*3/MM3 (ref 0–0.4)
EOSINOPHIL NFR BLD AUTO: 1.1 % (ref 0.3–6.2)
ERYTHROCYTE [DISTWIDTH] IN BLOOD BY AUTOMATED COUNT: 13.3 % (ref 12.3–15.4)
GFR SERPL CREATININE-BSD FRML MDRD: 91 ML/MIN/1.73
GLOBULIN UR ELPH-MCNC: 3.1 GM/DL
GLUCOSE SERPL-MCNC: 76 MG/DL (ref 65–99)
GLUCOSE UR STRIP-MCNC: NEGATIVE MG/DL
HCT VFR BLD AUTO: 39.7 % (ref 34–46.6)
HGB BLD-MCNC: 14 G/DL (ref 12–15.9)
HGB UR QL STRIP.AUTO: NEGATIVE
IMM GRANULOCYTES # BLD AUTO: 0.05 10*3/MM3 (ref 0–0.05)
IMM GRANULOCYTES NFR BLD AUTO: 0.5 % (ref 0–0.5)
KETONES UR QL STRIP: NEGATIVE
LEUKOCYTE ESTERASE UR QL STRIP.AUTO: NEGATIVE
LYMPHOCYTES # BLD AUTO: 2.21 10*3/MM3 (ref 0.7–3.1)
LYMPHOCYTES NFR BLD AUTO: 24.1 % (ref 19.6–45.3)
MCH RBC QN AUTO: 32.8 PG (ref 26.6–33)
MCHC RBC AUTO-ENTMCNC: 35.3 G/DL (ref 31.5–35.7)
MCV RBC AUTO: 93 FL (ref 79–97)
MONOCYTES # BLD AUTO: 0.78 10*3/MM3 (ref 0.1–0.9)
MONOCYTES NFR BLD AUTO: 8.5 % (ref 5–12)
NEUTROPHILS NFR BLD AUTO: 5.99 10*3/MM3 (ref 1.7–7)
NEUTROPHILS NFR BLD AUTO: 65.3 % (ref 42.7–76)
NITRITE UR QL STRIP: NEGATIVE
NRBC BLD AUTO-RTO: 0 /100 WBC (ref 0–0.2)
PH UR STRIP.AUTO: 7.5 [PH] (ref 5–8)
PLATELET # BLD AUTO: 268 10*3/MM3 (ref 140–450)
PMV BLD AUTO: 10.8 FL (ref 6–12)
POTASSIUM SERPL-SCNC: 4.5 MMOL/L (ref 3.5–5.2)
PROT SERPL-MCNC: 6.7 G/DL (ref 6–8.5)
PROT UR QL STRIP: NEGATIVE
PROT UR-MCNC: <4 MG/DL
PROT/CREAT UR: NORMAL MG/G{CREAT}
RBC # BLD AUTO: 4.27 10*6/MM3 (ref 3.77–5.28)
SODIUM SERPL-SCNC: 135 MMOL/L (ref 136–145)
SP GR UR STRIP: <1.005 (ref 1–1.03)
UROBILINOGEN UR QL STRIP: ABNORMAL
WBC # BLD AUTO: 9.18 10*3/MM3 (ref 3.4–10.8)

## 2021-01-11 PROCEDURE — 85025 COMPLETE CBC W/AUTO DIFF WBC: CPT | Performed by: OBSTETRICS & GYNECOLOGY

## 2021-01-11 PROCEDURE — 84156 ASSAY OF PROTEIN URINE: CPT | Performed by: OBSTETRICS & GYNECOLOGY

## 2021-01-11 PROCEDURE — 80053 COMPREHEN METABOLIC PANEL: CPT | Performed by: OBSTETRICS & GYNECOLOGY

## 2021-01-11 PROCEDURE — 82570 ASSAY OF URINE CREATININE: CPT | Performed by: OBSTETRICS & GYNECOLOGY

## 2021-01-11 PROCEDURE — 59025 FETAL NON-STRESS TEST: CPT

## 2021-01-11 PROCEDURE — 87086 URINE CULTURE/COLONY COUNT: CPT | Performed by: OBSTETRICS & GYNECOLOGY

## 2021-01-11 PROCEDURE — 81003 URINALYSIS AUTO W/O SCOPE: CPT | Performed by: OBSTETRICS & GYNECOLOGY

## 2021-01-11 PROCEDURE — G0378 HOSPITAL OBSERVATION PER HR: HCPCS

## 2021-01-11 RX ORDER — ERGOCALCIFEROL (VITAMIN D2) 10 MCG
400 TABLET ORAL DAILY
COMMUNITY
End: 2021-11-09

## 2021-01-11 NOTE — NON STRESS TEST
Olga Szymanski, a  at 37w4d with an TRINITY of 2021, by Other Basis, was seen at [facility] for a nonstress test.    Chief Complaint   Patient presents with   • Elevated Blood Pressure     pt reports elevated BP at home and in office (140/110). +FM -vb or lof. denies HA and reports floaters intermittent for the past 2 months, not at this time        Patient Active Problem List   Diagnosis   • Allergic   • Asthma   • Pregnancy       Start Time:   Stop Time:  Interpretation A  Nonstress Test Interpretation A: Reactive (21 1519 : Donna Bowman, RN)

## 2021-01-12 ENCOUNTER — LAB (OUTPATIENT)
Dept: LAB | Facility: HOSPITAL | Age: 36
End: 2021-01-12

## 2021-01-12 DIAGNOSIS — Z3A.37 37 WEEKS GESTATION OF PREGNANCY: ICD-10-CM

## 2021-01-12 LAB
BACTERIA SPEC AEROBE CULT: NO GROWTH
PROT 24H UR-MRATE: 160 MG/24HOURS (ref 0–150)

## 2021-01-12 PROCEDURE — 81050 URINALYSIS VOLUME MEASURE: CPT

## 2021-01-12 PROCEDURE — 84156 ASSAY OF PROTEIN URINE: CPT

## 2021-01-13 ENCOUNTER — ANESTHESIA (OUTPATIENT)
Dept: LABOR AND DELIVERY | Facility: HOSPITAL | Age: 36
End: 2021-01-13

## 2021-01-13 ENCOUNTER — ANESTHESIA EVENT (OUTPATIENT)
Dept: LABOR AND DELIVERY | Facility: HOSPITAL | Age: 36
End: 2021-01-13

## 2021-01-13 ENCOUNTER — HOSPITAL ENCOUNTER (INPATIENT)
Facility: HOSPITAL | Age: 36
LOS: 3 days | Discharge: HOME | End: 2021-01-16
Attending: OBSTETRICS & GYNECOLOGY | Admitting: OBSTETRICS & GYNECOLOGY

## 2021-01-13 LAB
ABO GROUP BLD: NORMAL
ALBUMIN SERPL-MCNC: 3.6 G/DL (ref 3.5–5.2)
ALBUMIN/GLOB SERPL: 1.5 G/DL
ALP SERPL-CCNC: 110 U/L (ref 39–117)
ALT SERPL W P-5'-P-CCNC: 14 U/L (ref 1–33)
ANION GAP SERPL CALCULATED.3IONS-SCNC: 11.7 MMOL/L (ref 5–15)
AST SERPL-CCNC: 17 U/L (ref 1–32)
BILIRUB SERPL-MCNC: 0.2 MG/DL (ref 0–1.2)
BLD GP AB SCN SERPL QL: POSITIVE
BUN SERPL-MCNC: 7 MG/DL (ref 6–20)
BUN/CREAT SERPL: 10 (ref 7–25)
CALCIUM SPEC-SCNC: 8.9 MG/DL (ref 8.6–10.5)
CHLORIDE SERPL-SCNC: 103 MMOL/L (ref 98–107)
CO2 SERPL-SCNC: 21.3 MMOL/L (ref 22–29)
CREAT SERPL-MCNC: 0.7 MG/DL (ref 0.57–1)
DEPRECATED RDW RBC AUTO: 43.4 FL (ref 37–54)
ERYTHROCYTE [DISTWIDTH] IN BLOOD BY AUTOMATED COUNT: 13 % (ref 12.3–15.4)
GFR SERPL CREATININE-BSD FRML MDRD: 94 ML/MIN/1.73
GLOBULIN UR ELPH-MCNC: 2.4 GM/DL
GLUCOSE SERPL-MCNC: 68 MG/DL (ref 65–99)
HCT VFR BLD AUTO: 41.1 % (ref 34–46.6)
HGB BLD-MCNC: 14.3 G/DL (ref 12–15.9)
MCH RBC QN AUTO: 32.1 PG (ref 26.6–33)
MCHC RBC AUTO-ENTMCNC: 34.8 G/DL (ref 31.5–35.7)
MCV RBC AUTO: 92.2 FL (ref 79–97)
PLATELET # BLD AUTO: 270 10*3/MM3 (ref 140–450)
PMV BLD AUTO: 11.4 FL (ref 6–12)
POTASSIUM SERPL-SCNC: 3.9 MMOL/L (ref 3.5–5.2)
PROT SERPL-MCNC: 6 G/DL (ref 6–8.5)
RBC # BLD AUTO: 4.46 10*6/MM3 (ref 3.77–5.28)
RESIDUAL RHIG DETECTED: NORMAL
RH BLD: NEGATIVE
SARS-COV-2 RNA RESP QL NAA+PROBE: NOT DETECTED
SODIUM SERPL-SCNC: 136 MMOL/L (ref 136–145)
T&S EXPIRATION DATE: NORMAL
WBC # BLD AUTO: 8.5 10*3/MM3 (ref 3.4–10.8)

## 2021-01-13 PROCEDURE — 86901 BLOOD TYPING SEROLOGIC RH(D): CPT | Performed by: OBSTETRICS & GYNECOLOGY

## 2021-01-13 PROCEDURE — 85027 COMPLETE CBC AUTOMATED: CPT | Performed by: OBSTETRICS & GYNECOLOGY

## 2021-01-13 PROCEDURE — 3E0P7VZ INTRODUCTION OF HORMONE INTO FEMALE REPRODUCTIVE, VIA NATURAL OR ARTIFICIAL OPENING: ICD-10-PCS | Performed by: OBSTETRICS & GYNECOLOGY

## 2021-01-13 PROCEDURE — 86850 RBC ANTIBODY SCREEN: CPT | Performed by: OBSTETRICS & GYNECOLOGY

## 2021-01-13 PROCEDURE — 80053 COMPREHEN METABOLIC PANEL: CPT | Performed by: OBSTETRICS & GYNECOLOGY

## 2021-01-13 PROCEDURE — U0003 INFECTIOUS AGENT DETECTION BY NUCLEIC ACID (DNA OR RNA); SEVERE ACUTE RESPIRATORY SYNDROME CORONAVIRUS 2 (SARS-COV-2) (CORONAVIRUS DISEASE [COVID-19]), AMPLIFIED PROBE TECHNIQUE, MAKING USE OF HIGH THROUGHPUT TECHNOLOGIES AS DESCRIBED BY CMS-2020-01-R: HCPCS | Performed by: OBSTETRICS & GYNECOLOGY

## 2021-01-13 PROCEDURE — 86870 RBC ANTIBODY IDENTIFICATION: CPT | Performed by: OBSTETRICS & GYNECOLOGY

## 2021-01-13 PROCEDURE — 86900 BLOOD TYPING SEROLOGIC ABO: CPT | Performed by: OBSTETRICS & GYNECOLOGY

## 2021-01-13 RX ORDER — CALCIUM CARBONATE 750 MG/1
750 TABLET, CHEWABLE ORAL DAILY PRN
COMMUNITY
End: 2021-11-09

## 2021-01-13 RX ORDER — EPHEDRINE SULFATE 50 MG/ML
10 INJECTION, SOLUTION INTRAVENOUS
Status: DISCONTINUED | OUTPATIENT
Start: 2021-01-13 | End: 2021-01-14 | Stop reason: HOSPADM

## 2021-01-13 RX ORDER — FAMOTIDINE 10 MG/ML
20 INJECTION, SOLUTION INTRAVENOUS ONCE AS NEEDED
Status: DISCONTINUED | OUTPATIENT
Start: 2021-01-13 | End: 2021-01-14 | Stop reason: HOSPADM

## 2021-01-13 RX ORDER — MAGNESIUM CARB/ALUMINUM HYDROX 105-160MG
30 TABLET,CHEWABLE ORAL ONCE
Status: COMPLETED | OUTPATIENT
Start: 2021-01-13 | End: 2021-01-14

## 2021-01-13 RX ORDER — BUTORPHANOL TARTRATE 1 MG/ML
1 INJECTION, SOLUTION INTRAMUSCULAR; INTRAVENOUS ONCE
Status: DISCONTINUED | OUTPATIENT
Start: 2021-01-13 | End: 2021-01-15

## 2021-01-13 RX ORDER — ZOLPIDEM TARTRATE 5 MG/1
5 TABLET ORAL NIGHTLY PRN
Status: DISCONTINUED | OUTPATIENT
Start: 2021-01-13 | End: 2021-01-15

## 2021-01-13 RX ORDER — SODIUM CHLORIDE, SODIUM LACTATE, POTASSIUM CHLORIDE, CALCIUM CHLORIDE 600; 310; 30; 20 MG/100ML; MG/100ML; MG/100ML; MG/100ML
125 INJECTION, SOLUTION INTRAVENOUS CONTINUOUS
Status: DISCONTINUED | OUTPATIENT
Start: 2021-01-13 | End: 2021-01-15

## 2021-01-13 RX ORDER — ACETAMINOPHEN 325 MG/1
650 TABLET ORAL EVERY 4 HOURS PRN
Status: DISCONTINUED | OUTPATIENT
Start: 2021-01-13 | End: 2021-01-14 | Stop reason: HOSPADM

## 2021-01-13 RX ORDER — ONDANSETRON 2 MG/ML
4 INJECTION INTRAMUSCULAR; INTRAVENOUS ONCE AS NEEDED
Status: COMPLETED | OUTPATIENT
Start: 2021-01-13 | End: 2021-01-14

## 2021-01-13 RX ORDER — SODIUM CHLORIDE 0.9 % (FLUSH) 0.9 %
3 SYRINGE (ML) INJECTION EVERY 12 HOURS SCHEDULED
Status: DISCONTINUED | OUTPATIENT
Start: 2021-01-13 | End: 2021-01-14 | Stop reason: HOSPADM

## 2021-01-13 RX ORDER — OXYTOCIN-SODIUM CHLORIDE 0.9% IV SOLN 30 UNIT/500ML 30-0.9/5 UT/ML-%
1-20 SOLUTION INTRAVENOUS
Status: DISCONTINUED | OUTPATIENT
Start: 2021-01-14 | End: 2021-01-15

## 2021-01-13 RX ORDER — SODIUM CHLORIDE 0.9 % (FLUSH) 0.9 %
10 SYRINGE (ML) INJECTION AS NEEDED
Status: DISCONTINUED | OUTPATIENT
Start: 2021-01-13 | End: 2021-01-14 | Stop reason: HOSPADM

## 2021-01-13 RX ORDER — LIDOCAINE HYDROCHLORIDE 10 MG/ML
5 INJECTION, SOLUTION EPIDURAL; INFILTRATION; INTRACAUDAL; PERINEURAL AS NEEDED
Status: DISCONTINUED | OUTPATIENT
Start: 2021-01-13 | End: 2021-01-14 | Stop reason: HOSPADM

## 2021-01-13 RX ORDER — ONDANSETRON 2 MG/ML
4 INJECTION INTRAMUSCULAR; INTRAVENOUS ONCE AS NEEDED
Status: DISCONTINUED | OUTPATIENT
Start: 2021-01-13 | End: 2021-01-14 | Stop reason: HOSPADM

## 2021-01-13 RX ADMIN — SODIUM CHLORIDE, POTASSIUM CHLORIDE, SODIUM LACTATE AND CALCIUM CHLORIDE 500 ML: 600; 310; 30; 20 INJECTION, SOLUTION INTRAVENOUS at 12:50

## 2021-01-13 RX ADMIN — ACETAMINOPHEN 650 MG: 325 TABLET, FILM COATED ORAL at 22:22

## 2021-01-13 RX ADMIN — DINOPROSTONE 10 MG: 10 INSERT VAGINAL at 14:21

## 2021-01-13 RX ADMIN — ZOLPIDEM TARTRATE 5 MG: 5 TABLET ORAL at 20:18

## 2021-01-13 NOTE — H&P
.[facility]  Obstetric History and Physical    Chief Complaint   Patient presents with   • Scheduled Induction     Reports decreased fetal movement for 24 hours, BPP today in the offce, Oligohydramnios noted       Subjective     Patient is a 35 y.o. female  currently at 37w6d sent from office due to bpp 4/8 ( no tone/no movement) with danny 3.  No problems. No leaking. Good FM. No vb. No contns.    Seen on LD 2 days ago with gest HTN. 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 Her smoking use included cigarettes. She has never used smokeless tobacco.   reports previous alcohol use.   reports no history of drug use.    Allergies:     Patient has no known allergies.       Objective       Vital Signs Range for the last 24 hours  Temperature: Temp:  [98.7 °F (37.1 °C)] 98.7 °F (37.1 °C)   Temp Source: Temp src: Oral   BP: BP: (134)/(84) 134/84   Pulse: Heart Rate:  [77] 77   Respirations: Resp:  [20] 20                   Physical Examination:     General  :  Alert in NAD  Abdomen: Gravid, nontender    cvx- 1/30/-2/ mid/ avg    NST- cat 1. Reactive, good anh    toco- no contns.    Results from last 7 days   Lab Units 01/11/21  1213   WBC 10*3/mm3 9.18   HEMOGLOBIN g/dL 14.0   HEMATOCRIT % 39.7   PLATELETS 10*3/mm3 268         Results from last 7 days   Lab Units 01/11/21  1213   SODIUM mmol/L 135*   POTASSIUM mmol/L 4.5   CHLORIDE mmol/L 101   CO2 mmol/L 22.0   BUN mg/dL 8   CREATININE mg/dL 0.72   CALCIUM mg/dL 9.0   BILIRUBIN mg/dL 0.3   ALK PHOS U/L 111   ALT (SGPT) U/L 15   AST (SGOT) U/L 19   GLUCOSE mg/dL 76                 Assessment/Plan       Assessment:  1.  Intrauterine pregnancy at 37w6d weeks gestation with oligohydramnios and nonreasurring testing in office today. Here with reassuring fetal status.    2.  Gest HTN- bp here good so far    Plan:   recommend induction. Will ripen with cervidil then pitocin.     Plan of care has been reviewed with patient and family,.   All questions answered.          Office prenatal reviewed        Ana Laura Joseph MD  1/13/2021  13:26 EST

## 2021-01-13 NOTE — PROGRESS NOTES
RN not able to place cervidil.    I placed but was not able to get behind cervix due to pt intolerance of exam

## 2021-01-14 LAB
ABO GROUP BLD: NORMAL
FETAL BLEED: NEGATIVE
NUMBER OF DOSES: NORMAL
RH BLD: NEGATIVE

## 2021-01-14 PROCEDURE — 10907ZC DRAINAGE OF AMNIOTIC FLUID, THERAPEUTIC FROM PRODUCTS OF CONCEPTION, VIA NATURAL OR ARTIFICIAL OPENING: ICD-10-PCS | Performed by: OBSTETRICS & GYNECOLOGY

## 2021-01-14 PROCEDURE — 25010000002 FENTANYL CITRATE (PF) 2500 MCG/50ML SOLUTION: Performed by: ANESTHESIOLOGY

## 2021-01-14 PROCEDURE — 85461 HEMOGLOBIN FETAL: CPT | Performed by: OBSTETRICS & GYNECOLOGY

## 2021-01-14 PROCEDURE — 25010000002 ROPIVACAINE PER 1 MG: Performed by: ANESTHESIOLOGY

## 2021-01-14 PROCEDURE — 0HQ9XZZ REPAIR PERINEUM SKIN, EXTERNAL APPROACH: ICD-10-PCS | Performed by: OBSTETRICS & GYNECOLOGY

## 2021-01-14 PROCEDURE — 3E033VJ INTRODUCTION OF OTHER HORMONE INTO PERIPHERAL VEIN, PERCUTANEOUS APPROACH: ICD-10-PCS | Performed by: OBSTETRICS & GYNECOLOGY

## 2021-01-14 PROCEDURE — 10H07YZ INSERTION OF OTHER DEVICE INTO PRODUCTS OF CONCEPTION, VIA NATURAL OR ARTIFICIAL OPENING: ICD-10-PCS | Performed by: OBSTETRICS & GYNECOLOGY

## 2021-01-14 PROCEDURE — 25010000002 RHO D IMMUNE GLOBULIN 1500 UNIT/2ML SOLUTION PREFILLED SYRINGE: Performed by: OBSTETRICS & GYNECOLOGY

## 2021-01-14 PROCEDURE — 25010000002 ONDANSETRON PER 1 MG: Performed by: ANESTHESIOLOGY

## 2021-01-14 PROCEDURE — C1755 CATHETER, INTRASPINAL: HCPCS

## 2021-01-14 PROCEDURE — 86901 BLOOD TYPING SEROLOGIC RH(D): CPT | Performed by: OBSTETRICS & GYNECOLOGY

## 2021-01-14 PROCEDURE — 86900 BLOOD TYPING SEROLOGIC ABO: CPT | Performed by: OBSTETRICS & GYNECOLOGY

## 2021-01-14 PROCEDURE — C1755 CATHETER, INTRASPINAL: HCPCS | Performed by: ANESTHESIOLOGY

## 2021-01-14 RX ORDER — BISACODYL 10 MG
10 SUPPOSITORY, RECTAL RECTAL DAILY PRN
Status: DISCONTINUED | OUTPATIENT
Start: 2021-01-15 | End: 2021-01-16 | Stop reason: HOSPADM

## 2021-01-14 RX ORDER — ONDANSETRON 4 MG/1
4 TABLET, FILM COATED ORAL EVERY 8 HOURS PRN
Status: DISCONTINUED | OUTPATIENT
Start: 2021-01-14 | End: 2021-01-16 | Stop reason: HOSPADM

## 2021-01-14 RX ORDER — MISOPROSTOL 200 UG/1
800 TABLET ORAL AS NEEDED
Status: DISCONTINUED | OUTPATIENT
Start: 2021-01-14 | End: 2021-01-14 | Stop reason: HOSPADM

## 2021-01-14 RX ORDER — PHYTONADIONE 1 MG/.5ML
INJECTION, EMULSION INTRAMUSCULAR; INTRAVENOUS; SUBCUTANEOUS
Status: DISPENSED
Start: 2021-01-14 | End: 2021-01-14

## 2021-01-14 RX ORDER — METHYLERGONOVINE MALEATE 0.2 MG/ML
200 INJECTION INTRAVENOUS ONCE AS NEEDED
Status: DISCONTINUED | OUTPATIENT
Start: 2021-01-14 | End: 2021-01-14 | Stop reason: HOSPADM

## 2021-01-14 RX ORDER — DIPHENHYDRAMINE HCL 25 MG
25 CAPSULE ORAL NIGHTLY PRN
Status: DISCONTINUED | OUTPATIENT
Start: 2021-01-14 | End: 2021-01-16 | Stop reason: HOSPADM

## 2021-01-14 RX ORDER — OXYCODONE HYDROCHLORIDE AND ACETAMINOPHEN 5; 325 MG/1; MG/1
1 TABLET ORAL EVERY 4 HOURS PRN
Status: DISCONTINUED | OUTPATIENT
Start: 2021-01-14 | End: 2021-01-16 | Stop reason: HOSPADM

## 2021-01-14 RX ORDER — SODIUM CHLORIDE 0.9 % (FLUSH) 0.9 %
1-10 SYRINGE (ML) INJECTION AS NEEDED
Status: DISCONTINUED | OUTPATIENT
Start: 2021-01-14 | End: 2021-01-16 | Stop reason: HOSPADM

## 2021-01-14 RX ORDER — ERYTHROMYCIN 5 MG/G
OINTMENT OPHTHALMIC
Status: DISPENSED
Start: 2021-01-14 | End: 2021-01-14

## 2021-01-14 RX ORDER — CARBOPROST TROMETHAMINE 250 UG/ML
250 INJECTION, SOLUTION INTRAMUSCULAR AS NEEDED
Status: DISCONTINUED | OUTPATIENT
Start: 2021-01-14 | End: 2021-01-14 | Stop reason: HOSPADM

## 2021-01-14 RX ORDER — OXYTOCIN-SODIUM CHLORIDE 0.9% IV SOLN 30 UNIT/500ML 30-0.9/5 UT/ML-%
125 SOLUTION INTRAVENOUS CONTINUOUS PRN
Status: COMPLETED | OUTPATIENT
Start: 2021-01-14 | End: 2021-01-14

## 2021-01-14 RX ORDER — DOCUSATE SODIUM 100 MG/1
100 CAPSULE, LIQUID FILLED ORAL 2 TIMES DAILY
Status: DISCONTINUED | OUTPATIENT
Start: 2021-01-14 | End: 2021-01-16 | Stop reason: HOSPADM

## 2021-01-14 RX ORDER — HYDROCORTISONE 25 MG/G
1 CREAM TOPICAL AS NEEDED
Status: DISCONTINUED | OUTPATIENT
Start: 2021-01-14 | End: 2021-01-16 | Stop reason: HOSPADM

## 2021-01-14 RX ORDER — OXYTOCIN-SODIUM CHLORIDE 0.9% IV SOLN 30 UNIT/500ML 30-0.9/5 UT/ML-%
250 SOLUTION INTRAVENOUS CONTINUOUS PRN
Status: ACTIVE | OUTPATIENT
Start: 2021-01-14 | End: 2021-01-14

## 2021-01-14 RX ORDER — IBUPROFEN 600 MG/1
600 TABLET ORAL EVERY 8 HOURS PRN
Status: DISCONTINUED | OUTPATIENT
Start: 2021-01-14 | End: 2021-01-16 | Stop reason: HOSPADM

## 2021-01-14 RX ORDER — OXYTOCIN-SODIUM CHLORIDE 0.9% IV SOLN 30 UNIT/500ML 30-0.9/5 UT/ML-%
999 SOLUTION INTRAVENOUS ONCE
Status: COMPLETED | OUTPATIENT
Start: 2021-01-14 | End: 2021-01-14

## 2021-01-14 RX ADMIN — OXYTOCIN 125 ML/HR: 10 INJECTION INTRAVENOUS at 13:14

## 2021-01-14 RX ADMIN — OXYCODONE HYDROCHLORIDE AND ACETAMINOPHEN 1 TABLET: 5; 325 TABLET ORAL at 22:56

## 2021-01-14 RX ADMIN — Medication: at 16:48

## 2021-01-14 RX ADMIN — SODIUM CHLORIDE, POTASSIUM CHLORIDE, SODIUM LACTATE AND CALCIUM CHLORIDE 125 ML/HR: 600; 310; 30; 20 INJECTION, SOLUTION INTRAVENOUS at 02:30

## 2021-01-14 RX ADMIN — DOCUSATE SODIUM 100 MG: 100 CAPSULE, LIQUID FILLED ORAL at 20:55

## 2021-01-14 RX ADMIN — HUMAN RHO(D) IMMUNE GLOBULIN 1500 UNITS: 1500 SOLUTION INTRAMUSCULAR; INTRAVENOUS at 16:40

## 2021-01-14 RX ADMIN — FENTANYL CITRATE 10 ML/HR: 0.05 INJECTION, SOLUTION INTRAMUSCULAR; INTRAVENOUS at 10:08

## 2021-01-14 RX ADMIN — OXYTOCIN 999 ML/HR: 10 INJECTION INTRAVENOUS at 11:56

## 2021-01-14 RX ADMIN — SODIUM CHLORIDE, POTASSIUM CHLORIDE, SODIUM LACTATE AND CALCIUM CHLORIDE 125 ML/HR: 600; 310; 30; 20 INJECTION, SOLUTION INTRAVENOUS at 00:15

## 2021-01-14 RX ADMIN — SODIUM CHLORIDE, POTASSIUM CHLORIDE, SODIUM LACTATE AND CALCIUM CHLORIDE 125 ML/HR: 600; 310; 30; 20 INJECTION, SOLUTION INTRAVENOUS at 06:58

## 2021-01-14 RX ADMIN — IBUPROFEN 600 MG: 600 TABLET ORAL at 16:52

## 2021-01-14 RX ADMIN — MINERAL OIL 30 ML: 1000 SOLUTION ORAL at 07:20

## 2021-01-14 RX ADMIN — FENTANYL CITRATE 10 ML/HR: 0.05 INJECTION, SOLUTION INTRAMUSCULAR; INTRAVENOUS at 02:11

## 2021-01-14 RX ADMIN — ONDANSETRON 4 MG: 2 INJECTION INTRAMUSCULAR; INTRAVENOUS at 09:34

## 2021-01-14 NOTE — PROGRESS NOTES
Cervidil still in. Rose such that got epidural. Comfortable now.    140/90s. Nothing tx range    fht- cat 1    toco- q 2 min    cvx- 2-3/80/-2. AROM- clear. iupc placed      A/p- induction for oligo with gest HTN -- bp stable. S/p 12 hr cervidil. In labor. Pit prn mvu 200-250

## 2021-01-14 NOTE — LACTATION NOTE
This note was copied from a baby's chart.  P1T, SGA. Mother reports that infant did not latch in L&D, did skin to skin but infant too sleepy to latch. Demonstrated hand expression for mother, large drops of colostrum obtained, mother and father returned demonstration. Attempted to help mother with latch, infant very sleepy and will latch but immediately falls asleep. After several attempts, encouraged mother to hand express to obtain EBM for this feeding. Discussed insurance pumping as well, set mother up on HGP and instructed on use/cleaning. 24mm flange appeared to fit. Discussed cup feeding/syringe feeding. Encouraged mother to try at the breast every 2-3hrs with a goal of 15 min per side, if infant unable to latch for that feeding, to hand express 10 min per side instead. Mother expresses understanding. Discussed how to know baby is getting enough and when to expect milk to come in. Has a personal pump.      Lactation Consult Note    Evaluation Completed: 2021 17:21 EST  Patient Name: Brenda Szymanski  :  2023  MRN:  [id number]     REFERRAL  INFORMATION:                          Date of Referral: 21   Person Making Referral: nurse  Maternal Reason for Referral: breastfeeding currently       DELIVERY HISTORY:  This patient has no babies on file.  This patient has no babies on file.  Skin to skin initiation date/time: 2021 11:53 AM  Skin to skin end date/time: 2021 1:00 PM  This patient has no babies on file.    MATERNAL ASSESSMENT:  Breast Size Issue: none (21)  Breast Shape: Bilateral:, round (21)  Breast Density: Bilateral:, soft (21)  Areola: Bilateral:, elastic (21)  Nipples: Bilateral:, everted (21)                MATERNAL INFANT FEEDING:     Maternal Emotional State: receptive, anxious (21)  Infant Positioning: clutch/football (21)                                                               EQUIPMENT TYPE:  Breast Pump Type: double electric, hospital grade (01/14/21 1600)  Breast Pump Flange Type: hard (01/14/21 1600)  Breast Pump Flange Size: 24 mm (01/14/21 1600)                        BREAST PUMPING:  Breast Pumping Interventions: early pumping promoted, frequent pumping encouraged (01/14/21 1600)       LACTATION REFERRALS:

## 2021-01-14 NOTE — PROGRESS NOTES
fhr decel with pushing. 0 station. Will labor down since no fhr decels when not pushing.    Will d/w dr hassan who will be assuming care.

## 2021-01-14 NOTE — L&D DELIVERY NOTE
[facility]  Vaginal Delivery Note    Olga Szymanski35 y.o.  at 38w0d    Induction: Dinoprostone Insert;Amniotomy   Induction indication: Hypertension   Labor onset:2021  2:14 AM   Dilation complete: 2021  6:50 AM   Beginning of second stage: 2021  9:57 AM     Antibiotics received during labor:      Delivery     Delivery: Vaginal, Spontaneous     YOB: 2023    Time of Birth:  Labor complications:  11:50 AM   None .   Anesthesia: Epidural     Delivering clinician: Angie Quezada MD   Additional complications:      Infant    Findings: Viable male  infant    Infant observations: Weight: 2637 g (5 lb 13 oz)    Observations/Comments:  scale2      Apgars: 8  @ 1 minute /    9  @ 5 minutes                                       Estimated Blood Loss: 300  mls.       Delivery narrative: The patient is a 35 y.o.  at 38w0d.  Presented in labor.  Membrane rupture/fluid: artificial rupture of membranes;Intact  at 2:14 AM  on 2021  Normal;Clear  Progressed normally to complete at 6:50 AM . Labored down until 2021  9:57 AM . Fetal status reassuring throughout.   of viable  infant.  11:50 AM . Nuchal cord x 2.  Both shoulders delivery easily. Apgars 8  @ 1 minute /9  @ 5 minutes. Weight: 2637 g (5 lb 13 oz) . Spontaneous delivery of an intact placenta with a 3-vessel cord. Cervix and rectum intact. 1st  degree laceration repaired in usual fashion with 2-0 chromic suture. Mother and baby well bonded and recovering well.             Angie Quezada MD  21  16:18 EST

## 2021-01-14 NOTE — PLAN OF CARE
Problem: Adult Inpatient Plan of Care  Goal: Plan of Care Review  Outcome: Met  Flowsheets  Taken 2021 0605 by Kendra Lizama RN  Progress: improving  Outcome Summary: Cervidil removed and AROM by MD, IUPC placed, MVUs adequate pt marybeth q 2-3 minutes palpating strong. Some late and prolonged decels improved with repositioning and amnioinfusion. Pt making quick progress after AROM, anticipating .  Taken 2021 by Cyndee Allen RN  Plan of Care Reviewed With:   patient   spouse  Goal: Patient-Specific Goal (Individualized)  Outcome: Met  Flowsheets (Taken 2021 by Cyndee Allen RN)  Patient-Specific Goals (Include Timeframe): safe and healthy delivery of baby  Individualized Care Needs: epidural, NIKUNJ, breastfeeding  Anxieties, Fears or Concerns: labor and pain  Goal: Absence of Hospital-Acquired Illness or Injury  Outcome: Met  Goal: Optimal Comfort and Wellbeing  Outcome: Met  Goal: Readiness for Transition of Care  Outcome: Met     Problem: Bleeding (Labor)  Goal: Hemostasis  Outcome: Met     Problem: Change in Fetal Wellbeing (Labor)  Goal: Stable Fetal Wellbeing  Outcome: Met     Problem: Delayed Labor Progression (Labor)  Goal: Effective Progression to Delivery  Outcome: Met     Problem: Infection (Labor)  Goal: Absence of Infection Signs and Symptoms  Outcome: Met     Problem: Labor Pain (Labor)  Goal: Acceptable Pain Control  Outcome: Met     Problem: Uterine Tachysystole (Labor)  Goal: Normal Uterine Contraction Pattern  Outcome: Met     Problem: Hypertensive Disorders in Pregnancy  Goal: Maternal-Fetal Stabilization  Outcome: Met   Goal Outcome Evaluation:  Plan of Care Reviewed With: patient, spouse  Progress: improving  Outcome Summary: Cervidil removed and AROM by MD, IUPC placed, MVUs adequate pt marybeth q 2-3 minutes palpating strong. Some late and prolonged decels improved with repositioning and amnioinfusion. Pt making quick progress after AROM, anticipating  MAXINE.

## 2021-01-14 NOTE — PLAN OF CARE
Problem: Adult Inpatient Plan of Care  Goal: Plan of Care Review  Outcome: Ongoing, Progressing  Flowsheets (Taken 2021)  Progress: improving  Plan of Care Reviewed With:   patient   spouse  Outcome Summary:  at 37+6wks arrives from office and is admitted d/t elevated b/ps in office and BPP 4/8. Cervidil placed at 1430. Category 1 FHR strip. B/Ps remain under call orders. Will continue with current POC for IOL  Goal: Patient-Specific Goal (Individualized)  Outcome: Ongoing, Progressing  Flowsheets (Taken 2021)  Patient-Specific Goals (Include Timeframe): safe and healthy delivery of baby  Individualized Care Needs: epidural, NIKUNJ, breastfeeding  Anxieties, Fears or Concerns: labor and pain  Goal: Absence of Hospital-Acquired Illness or Injury  Outcome: Ongoing, Progressing  Goal: Optimal Comfort and Wellbeing  Outcome: Ongoing, Progressing  Intervention: Provide Person-Centered Care  Recent Flowsheet Documentation  Taken 2021 by Cyndee Allen RN  Trust Relationship/Rapport:   care explained   choices provided   empathic listening provided   emotional support provided   questions answered   questions encouraged   reassurance provided   thoughts/feelings acknowledged  Goal: Readiness for Transition of Care  Outcome: Ongoing, Progressing     Problem: Bleeding (Labor)  Goal: Hemostasis  Outcome: Ongoing, Progressing     Problem: Change in Fetal Wellbeing (Labor)  Goal: Stable Fetal Wellbeing  Outcome: Ongoing, Progressing     Problem: Delayed Labor Progression (Labor)  Goal: Effective Progression to Delivery  Outcome: Ongoing, Progressing     Problem: Infection (Labor)  Goal: Absence of Infection Signs and Symptoms  Outcome: Ongoing, Progressing     Problem: Labor Pain (Labor)  Goal: Acceptable Pain Control  Outcome: Ongoing, Progressing  Intervention: Prevent or Manage Pain  Recent Flowsheet Documentation  Taken 2021 by Cyndee Allen, RN  Pain Management Interventions:    diversional activity encouraged   no interventions per patient request   relaxation techniques promoted  Taken 2021 1632 by Cyndee Allen, RN  Pain Management Interventions:   diversional activity encouraged   no interventions per patient request   relaxation techniques promoted     Problem: Uterine Tachysystole (Labor)  Goal: Normal Uterine Contraction Pattern  Outcome: Ongoing, Progressing     Problem: Hypertensive Disorders in Pregnancy  Goal: Maternal-Fetal Stabilization  Outcome: Ongoing, Progressing   Goal Outcome Evaluation:  Plan of Care Reviewed With: patient, spouse  Progress: improving  Outcome Summary:  at 37+6wks arrives from office and is admitted d/t elevated b/ps in office and BPP 4/8. Cervidil placed at 1430. Category 1 FHR strip. B/Ps remain under call orders. Will continue with current POC for IOL

## 2021-01-14 NOTE — PLAN OF CARE
Problem: Adult Inpatient Plan of Care  Goal: Optimal Comfort and Wellbeing  Intervention: Provide Person-Centered Care  Recent Flowsheet Documentation  Taken 1/14/2021 1215 by Bennett Jeronimo, RN  Trust Relationship/Rapport: care explained  Taken 1/14/2021 0830 by Bennett Jeronimo, RN  Trust Relationship/Rapport: care explained     Problem: Labor Pain (Labor)  Goal: Acceptable Pain Control  Intervention: Prevent or Manage Pain  Recent Flowsheet Documentation  Taken 1/14/2021 0720 by Bennett Jeronimo, RN  Pain Management Interventions: no interventions per patient request   Goal Outcome Evaluation:  Plan of Care Reviewed With: patient, spouse  Progress: improving

## 2021-01-14 NOTE — ANESTHESIA PROCEDURE NOTES
Labor Epidural    Pre-sedation assessment completed: 1/14/2021 2:50 AM    Patient reassessed immediately prior to procedure    Patient location during procedure: OB  Start Time: 1/14/2021 1:50 AM  Stop Time: 1/14/2021 2:02 AM  Indication:at surgeon's request  Performed By  Anesthesiologist: Freddie Oswald MD  Preanesthetic Checklist  Completed: patient identified, site marked, surgical consent, pre-op evaluation, timeout performed, IV checked, risks and benefits discussed and monitors and equipment checked  Additional Notes  Continuous infusion with Rop. 0.2% + Fent 2 mcg/cc @ 10ml/hr.  PCA 6 ml q 15 min.  Prep:  Pt Position:sitting  Sterile Tech:cap, gloves, mask and sterile barrier  Prep:chlorhexidine gluconate and isopropyl alcohol  Monitoring:blood pressure monitoring, continuous pulse oximetry and EKG  Epidural Block Procedure:  Approach:midline  Location:L4-L5  Needle Type:Tuohy  Needle Gauge:17  Aspiration:negative  Test Dose:negative  Post Assessment:  Dressing:occlusive dressing applied and secured with tape  Pt Tolerance:patient tolerated the procedure well with no apparent complications  Complications:no

## 2021-01-14 NOTE — NURSING NOTE
FHR 80's-90's after pushing with 3 contractions, pt instructed to rest during present contraction to monitor fhr

## 2021-01-15 LAB
BASOPHILS # BLD AUTO: 0.05 10*3/MM3 (ref 0–0.2)
BASOPHILS NFR BLD AUTO: 0.3 % (ref 0–1.5)
DEPRECATED RDW RBC AUTO: 44.2 FL (ref 37–54)
EOSINOPHIL # BLD AUTO: 0.11 10*3/MM3 (ref 0–0.4)
EOSINOPHIL NFR BLD AUTO: 0.7 % (ref 0.3–6.2)
ERYTHROCYTE [DISTWIDTH] IN BLOOD BY AUTOMATED COUNT: 12.9 % (ref 12.3–15.4)
HCT VFR BLD AUTO: 34.4 % (ref 34–46.6)
HGB BLD-MCNC: 12.2 G/DL (ref 12–15.9)
IMM GRANULOCYTES # BLD AUTO: 0.08 10*3/MM3 (ref 0–0.05)
IMM GRANULOCYTES NFR BLD AUTO: 0.5 % (ref 0–0.5)
LYMPHOCYTES # BLD AUTO: 1.78 10*3/MM3 (ref 0.7–3.1)
LYMPHOCYTES NFR BLD AUTO: 11.9 % (ref 19.6–45.3)
MCH RBC QN AUTO: 33.2 PG (ref 26.6–33)
MCHC RBC AUTO-ENTMCNC: 35.5 G/DL (ref 31.5–35.7)
MCV RBC AUTO: 93.5 FL (ref 79–97)
MONOCYTES # BLD AUTO: 0.78 10*3/MM3 (ref 0.1–0.9)
MONOCYTES NFR BLD AUTO: 5.2 % (ref 5–12)
NEUTROPHILS NFR BLD AUTO: 12.16 10*3/MM3 (ref 1.7–7)
NEUTROPHILS NFR BLD AUTO: 81.4 % (ref 42.7–76)
NRBC BLD AUTO-RTO: 0 /100 WBC (ref 0–0.2)
PLATELET # BLD AUTO: 225 10*3/MM3 (ref 140–450)
PMV BLD AUTO: 10.8 FL (ref 6–12)
RBC # BLD AUTO: 3.68 10*6/MM3 (ref 3.77–5.28)
WBC # BLD AUTO: 14.96 10*3/MM3 (ref 3.4–10.8)

## 2021-01-15 PROCEDURE — 85025 COMPLETE CBC W/AUTO DIFF WBC: CPT | Performed by: OBSTETRICS & GYNECOLOGY

## 2021-01-15 RX ADMIN — IBUPROFEN 600 MG: 600 TABLET ORAL at 19:55

## 2021-01-15 RX ADMIN — IBUPROFEN 600 MG: 600 TABLET ORAL at 02:26

## 2021-01-15 RX ADMIN — DOCUSATE SODIUM 100 MG: 100 CAPSULE, LIQUID FILLED ORAL at 21:29

## 2021-01-15 RX ADMIN — IBUPROFEN 600 MG: 600 TABLET ORAL at 11:01

## 2021-01-15 RX ADMIN — DOCUSATE SODIUM 100 MG: 100 CAPSULE, LIQUID FILLED ORAL at 11:01

## 2021-01-15 RX ADMIN — OXYCODONE HYDROCHLORIDE AND ACETAMINOPHEN 1 TABLET: 5; 325 TABLET ORAL at 19:55

## 2021-01-15 RX ADMIN — OXYCODONE HYDROCHLORIDE AND ACETAMINOPHEN 1 TABLET: 5; 325 TABLET ORAL at 14:09

## 2021-01-15 RX ADMIN — OXYCODONE HYDROCHLORIDE AND ACETAMINOPHEN 1 TABLET: 5; 325 TABLET ORAL at 03:16

## 2021-01-15 NOTE — PLAN OF CARE
Goal Outcome Evaluation:  Plan of Care Reviewed With: patient, spouse  Progress: improving   Assumed care of patient at 0300. Vital signs stable. Voiding without difficulty. Pain is controlled with po medication. Up ad yeny. Tolerating regular diet.

## 2021-01-15 NOTE — LACTATION NOTE
P1. AMA 37. Infant asleep in crib in swaddle. Enc S2S for increased stimulation. Patient has HGP at bedside but is concerned that she is getting less and less colostrum. She hand expresses and always gets a drop or two and places on baby's lips. Encouraged to stay well hydrated and pump consistently . Baby has been very sleepy so enc her to call LC for help with latching . Reassurance given.

## 2021-01-15 NOTE — PROGRESS NOTES
[facility]  Vaginal Delivery Progress Note    Patient Name: Olga Szymanski  :  1985  MRN:  [id number]      Subjective   Postpartum Day 1: Vaginal Delivery of a male infant.     The patient feels well without complaints.  Her pain is well controlled.  Reports normal lochia.     The patient plans to breastfeed.    Objective     Vital Signs Range for the last 24 hours  Temperature: Temp:  [97.5 °F (36.4 °C)-98.6 °F (37 °C)] 97.8 °F (36.6 °C)       BP: BP: (104-148)/() 127/84   Pulse: Heart Rate:  [] 76   Respirations: Resp:  [16-18] 18                       Physical Exam:  General: Awake and alert  Abdomen: Fundus: firm, non tender, 1 below umbilicus  Extremities:  trace edema, NT     Labs:     Results from last 7 days   Lab Units 21  1250 21  1213   WBC 10*3/mm3 8.50 9.18   HEMOGLOBIN g/dL 14.3 14.0   HEMATOCRIT % 41.1 39.7   PLATELETS 10*3/mm3 270 268       Prenatal labs results reviewed:  Yes   Rubella:  Immune  Rh Status:    RH type   Date Value Ref Range Status   2021 Negative  Final     Comment:     RhIG IS Indicated. Baby is Rh Positive         Assessment/Plan  : 1. PPD1 S/P  - Doing well, continue usual cares.      Desires circ for baby boy-- d/w     GHTN-- BP stable, no meds     Labs from this am still pending          Pregnancy          Margot Argueta, APRN  1/15/2021  09:44 EST

## 2021-01-15 NOTE — LACTATION NOTE
This note was copied from a baby's chart.  Mother called for latch assist. Infant has been very sleepy still and temp borderline low. Mother now has infant skin to skin. Encouraged skin to skin as much as tolerated, discussed SGA baby and wt/blood glucose. Mother was able to pump out about 15 ml EBM, assisted mother with giving 5 ml via syringe feed. Encouraged mother to continue to practice skin to skin along with father as much as tolerated tonight. Advised mother to pump every 3 hours and to feed infant via syringe. Encouraged to latch if infant showing feeding cues and rooting, but if infant still not showing cues to attempt at the breast again tomorrow and for tonight continue to pump/syringe feed. Discussed importance of maintaining temperature and blood glucose levels. Mother expressed understanding.

## 2021-01-15 NOTE — LACTATION NOTE
Lactation Consult Note  Called to assist with latching. Baby latches shallowly with weak suckle. Mom has some EBM that Dad is giving to baby while he is at the breast and Mom easily expresses colostrum. Since baby is sleepy now and she has attempted breast feeding for 20 minutes pumping is encouraged now. Explained to feed all EBM after pumping. Encouraged pumping every 3 hrs if baby is too sleepy to breast feed. They are also supplementing with formula. Will call for further assist.    Evaluation Completed: 1/15/2021 12:12 EST  Patient Name: Olga Szymanski  :  1985  MRN:  [id number]     REFERRAL  INFORMATION:                          Date of Referral: 01/15/21   Person Making Referral: nurse  Maternal Reason for Referral: breastfeeding currently  Infant Reason for Referral: low birth weight    DELIVERY HISTORY:        Skin to skin initiation date/time: 2021  11:53 AM   Skin to skin end date/time: 2021  1:00 PM        MATERNAL ASSESSMENT:  Breast Size Issue: none (01/15/21 1200)  Breast Shape: round (01/15/21 1200)  Breast Density: soft (01/15/21 1200)  Areola: elastic (01/15/21 1200)  Nipples: everted (01/15/21 1200)                INFANT ASSESSMENT:  Information for the patient's :  Brenda Szymanski [[id number]]   No past medical history on file.     Feeding Readiness Cues: quiet (01/15/21 1200)      Feeding Tolerance/Success: arousal required, suck weak (01/15/21 1200)      Satiety Cues: calm after feeding (01/15/21 1200)         Feeding Interventions: latch assistance provided (01/15/21 1200)               Breastfeeding: breastfeeding, bilateral (01/15/21 1200)   Infant Positioning: clutch/football, cross-cradle (01/15/21 1200)         Effective Latch During Feeding: no (01/15/21 1200)   Suck/Swallow Coordination: absent (01/15/21 1200)   Signs of Milk Transfer: none noted (01/15/21 1200)       Latch: 1-->repeated attempts, holds nipple in mouth, stimulate to suck (01/15/21  )   Audible Swallowin-->none (01/15/21 1200)   Type of Nipple: 2-->everted (after stimulation) (01/15/21 1200)   Comfort (Breast/Nipple): 2-->soft/nontender (01/15/21 1200)   Hold (Positioning): 0-->full assist (staff holds infant at breast) (01/15/21 1200)   Latch Score: 5 (01/15/21 1200)     Infant-Driven Feeding Scales - Readiness: Alert once handled. Some rooting or takes pacifier. Adequate tone. (01/15/21 1200)               MATERNAL INFANT FEEDING:     Maternal Emotional State: relaxed (01/15/21 1200)  Infant Positioning: clutch/football, cross-cradle (01/15/21 1200)                  Milk Ejection Reflex: present (01/15/21 1200)           Latch Assistance: full assistance needed (01/15/21 1200)                               EQUIPMENT TYPE:  Breast Pump Type: double electric, hospital grade (01/15/21 1200)                              BREAST PUMPING:          LACTATION REFERRALS:

## 2021-01-16 VITALS
BODY MASS INDEX: 37.45 KG/M2 | SYSTOLIC BLOOD PRESSURE: 138 MMHG | OXYGEN SATURATION: 100 % | TEMPERATURE: 98.6 F | WEIGHT: 233 LBS | HEART RATE: 80 BPM | RESPIRATION RATE: 18 BRPM | HEIGHT: 66 IN | DIASTOLIC BLOOD PRESSURE: 93 MMHG

## 2021-01-16 RX ORDER — IBUPROFEN 600 MG/1
600 TABLET ORAL EVERY 8 HOURS PRN
Qty: 60 TABLET | Refills: 0 | Status: SHIPPED | OUTPATIENT
Start: 2021-01-16 | End: 2021-11-09

## 2021-01-16 RX ADMIN — DOCUSATE SODIUM 100 MG: 100 CAPSULE, LIQUID FILLED ORAL at 09:09

## 2021-01-16 RX ADMIN — IBUPROFEN 600 MG: 600 TABLET ORAL at 03:40

## 2021-01-16 RX ADMIN — OXYCODONE HYDROCHLORIDE AND ACETAMINOPHEN 1 TABLET: 5; 325 TABLET ORAL at 03:40

## 2021-01-16 RX ADMIN — OXYCODONE HYDROCHLORIDE AND ACETAMINOPHEN 1 TABLET: 5; 325 TABLET ORAL at 09:10

## 2021-01-16 NOTE — DISCHARGE SUMMARY
Date of Discharge:  2021    Discharge Diagnosis: s/p     Presenting Problem/History of Present Illness  Pregnancy [Z34.90]  Pregnancy [Z34.90]       Hospital Course  Patient is a 35 y.o. female presented with iol for oligo and GHTN at 37 weeks. Delivered a viable male infant. No post op complications, BP has remained stable. f/u 1 week.      Procedures Performed         Consults:   Consults     No orders found from 12/15/2020 to 2021.          Condition on Discharge:   Subjective   Postpartum Day 2 Vaginal Delivery.    The patient feels well without complaints.    Vital Signs  Temp:  [97.4 °F (36.3 °C)-98.6 °F (37 °C)] 98.6 °F (37 °C)  Heart Rate:  [80-92] 80  Resp:  [18] 18  BP: (128-138)/(88-93) 138/93    Physical Exam:   General: Awake and alert   Abdomen: Fundus: firm, non tender    Extremities:  Calves NT bilaterally, 1 + edema    Assessment/Plan     PPD2  S/P  -   Stable for discharge. Instructions reviewed  GHTN- BP stable- f/u 1 week for bp check, precautions reviewed      Discharge Disposition  Home or Self Care    Discharge Medications     Discharge Medications      New Medications      Instructions Start Date   ibuprofen 600 MG tablet  Commonly known as: ADVIL,MOTRIN   600 mg, Oral, Every 8 Hours PRN         Continue These Medications      Instructions Start Date   albuterol sulfate  (90 Base) MCG/ACT inhaler  Commonly known as: PROVENTIL HFA;VENTOLIN HFA;PROAIR HFA   2 puffs, Inhalation, Every 4 Hours PRN      calcium carbonate  MG chewable tablet  Commonly known as: TUMS EX   750 mg, Oral, Daily PRN      multivitamin with minerals tablet tablet   1 tablet, Oral, Daily      Vitamin D (Cholecalciferol) 10 MCG (400 UNIT) tablet  Commonly known as: CHOLECALCIFEROL   400 Units, Oral, Daily         Stop These Medications    letrozole 2.5 MG tablet  Commonly known as: FEMARA              The patient has been prescribed a controlled substance.  She has been counseled on the risks  associated with using the medication.   The addictive potential of this medication and alternatives were discussed carefully with this patient and she demonstrated understanding.  A ELAINA report has been obtained and reviewed.       Activity at Discharge: restrictions reviewed    Follow-up Appointments  No future appointments.  Additional Instructions for the Follow-ups that You Need to Schedule     Discharge Follow-up with Specified Provider: [facility]; 1 Week   As directed      To: [facility]    Follow Up: 1 Week    Follow Up Details: Bp Check         Notify Physician or Go To The ED For the Following Conditions   As directed      Call if bleeding > 1 pad an hour/ large clots, fever, increasing pain, or bp > 140/90    Order Comments: Call if bleeding > 1 pad an hour/ large clots, fever, increasing pain, or bp > 140/90                Test Results Pending at Discharge       MARYANA Frank  01/16/21  08:36 EST

## 2021-01-28 ENCOUNTER — HOSPITAL ENCOUNTER (EMERGENCY)
Facility: HOSPITAL | Age: 36
Discharge: HOME | End: 2021-01-28
Attending: OBSTETRICS & GYNECOLOGY | Admitting: OBSTETRICS & GYNECOLOGY

## 2021-01-28 ENCOUNTER — HOSPITAL ENCOUNTER (OUTPATIENT)
Facility: HOSPITAL | Age: 36
End: 2021-01-28
Attending: OBSTETRICS & GYNECOLOGY | Admitting: OBSTETRICS & GYNECOLOGY

## 2021-01-28 VITALS
WEIGHT: 210 LBS | HEIGHT: 67 IN | BODY MASS INDEX: 32.96 KG/M2 | TEMPERATURE: 98 F | SYSTOLIC BLOOD PRESSURE: 142 MMHG | DIASTOLIC BLOOD PRESSURE: 90 MMHG | HEART RATE: 86 BPM | RESPIRATION RATE: 18 BRPM

## 2021-01-28 PROCEDURE — 99281 EMR DPT VST MAYX REQ PHY/QHP: CPT | Performed by: OBSTETRICS & GYNECOLOGY

## 2021-01-28 RX ORDER — NIFEDIPINE 30 MG/1
30 TABLET, EXTENDED RELEASE ORAL DAILY
COMMUNITY
End: 2021-11-09

## 2021-04-16 ENCOUNTER — BULK ORDERING (OUTPATIENT)
Dept: CASE MANAGEMENT | Facility: OTHER | Age: 36
End: 2021-04-16

## 2021-04-16 DIAGNOSIS — Z23 IMMUNIZATION DUE: ICD-10-CM

## 2021-11-09 ENCOUNTER — OFFICE VISIT (OUTPATIENT)
Dept: FAMILY MEDICINE CLINIC | Facility: CLINIC | Age: 36
End: 2021-11-09

## 2021-11-09 VITALS
WEIGHT: 210 LBS | SYSTOLIC BLOOD PRESSURE: 124 MMHG | BODY MASS INDEX: 32.96 KG/M2 | OXYGEN SATURATION: 99 % | DIASTOLIC BLOOD PRESSURE: 88 MMHG | HEIGHT: 67 IN | HEART RATE: 75 BPM

## 2021-11-09 DIAGNOSIS — Z01.89 ROUTINE LAB DRAW: ICD-10-CM

## 2021-11-09 DIAGNOSIS — Z00.00 PHYSICAL EXAM: ICD-10-CM

## 2021-11-09 DIAGNOSIS — F41.9 ANXIETY: ICD-10-CM

## 2021-11-09 DIAGNOSIS — R07.89 CHEST HEAVINESS: Primary | ICD-10-CM

## 2021-11-09 PROCEDURE — 93000 ELECTROCARDIOGRAM COMPLETE: CPT | Performed by: NURSE PRACTITIONER

## 2021-11-09 PROCEDURE — 99395 PREV VISIT EST AGE 18-39: CPT | Performed by: NURSE PRACTITIONER

## 2021-11-09 RX ORDER — SERTRALINE HYDROCHLORIDE 25 MG/1
TABLET, FILM COATED ORAL
Qty: 60 TABLET | Refills: 1 | Status: SHIPPED | OUTPATIENT
Start: 2021-11-09 | End: 2022-01-18

## 2021-11-09 NOTE — PROGRESS NOTES
Subjective   Olga Szymanski is a 35 y.o. female.   PMHX:Has been having some anxiety that is worsening and wanted to be checked out. She has no SI.  Reports she continues to have intermittent chest heaviness.  She denies any pain in the symptoms do not last long.  She reports that she feels like she is having a heart attack.  PSHX:Rhinoplasty  PFHX:brother and father have anxiety  Exercise:Not as much as you like  Diet:Well balanced diet  Sleep hygiene:Good sleep hygiene  Employment status: at [employer]      History of Present Illness   See above  The following portions of the patient's history were reviewed and updated as appropriate: allergies, current medications, past family history, past medical history, past social history, past surgical history and problem list.    Review of Systems   Constitutional: Negative for activity change, appetite change, chills and fever.   HENT: Negative for congestion.    Eyes: Negative for pain.   Respiratory: Positive for chest tightness. Negative for cough and shortness of breath.    Cardiovascular: Negative for chest pain and leg swelling.   Gastrointestinal: Negative for nausea and vomiting.   Genitourinary: Negative for difficulty urinating.   Skin: Negative for rash.   Neurological: Negative for dizziness, facial asymmetry and headache.   Psychiatric/Behavioral: Positive for stress. Negative for sleep disturbance and suicidal ideas. The patient is nervous/anxious.        Objective   Physical Exam  Vitals and nursing note reviewed.   Constitutional:       General: She is not in acute distress.     Appearance: She is well-developed.   HENT:      Head: Normocephalic and atraumatic.      Right Ear: External ear normal.      Left Ear: External ear normal.   Neck:      Thyroid: No thyromegaly.   Cardiovascular:      Rate and Rhythm: Normal rate and regular rhythm.      Pulses: Normal pulses.      Heart sounds: Normal heart sounds. No murmur heard.      Pulmonary:       Effort: Pulmonary effort is normal.      Breath sounds: Normal breath sounds.   Musculoskeletal:         General: Normal range of motion.      Cervical back: Neck supple.   Skin:     General: Skin is warm.   Neurological:      Mental Status: She is alert.         ECG 12 Lead    Date/Time: 11/9/2021 4:07 PM  Performed by: Azra Moraes APRN  Authorized by: Azra Moraes APRN   Rhythm: sinus rhythm  Rate: normal  ST Segments: ST segments normal  T Waves: T waves normal  QRS axis: normal    Clinical impression: normal ECG  Comments: She was here at about the same time last year with the same complaint and we did an EKG which was normal.  I believe because this has continued that she should be referred to cardiology to be evaluated.            Assessment/Plan   Diagnoses and all orders for this visit:    1. Chest heaviness (Primary)  -     ECG 12 Lead  -     Ambulatory Referral to Cardiology    2. Routine lab draw  -     Comprehensive Metabolic Panel  -     Lipid Panel With LDL / HDL Ratio  -     CBC (No Diff)    3. Physical exam    4. Anxiety    Other orders  -     sertraline (Zoloft) 25 MG tablet; 1 tablet daily for 2 weeks, then 2 tablets daily daily thereafter.  Dispense: 60 tablet; Refill: 1  -     ECG 12 Lead      Will call with her lab results.  We are going to try a trial of Zoloft.  She is to take 1 pill daily for 2 weeks and increase to 2 pills daily for a total of 50 mg.  She will check back in 4 weeks via [employer] to give us an update.  Referral placed to cardiology  Preventative counseling for diet and exercise with patient at visit.  Pt reports that they wear their seatbelt regularly.

## 2021-11-09 NOTE — PATIENT INSTRUCTIONS
Preventive Care 21-39 Years Old, Female  Preventive care refers to lifestyle choices and visits with your health care provider that can promote health and wellness. This includes:  · A yearly physical exam. This is also called an annual wellness visit.  · Regular dental and eye exams.  · Immunizations.  · Screening for certain conditions.  · Healthy lifestyle choices, such as:  ? Eating a healthy diet.  ? Getting regular exercise.  ? Not using drugs or products that contain nicotine and tobacco.  ? Limiting alcohol use.  What can I expect for my preventive care visit?  Physical exam  Your health care provider may check your:  · Height and weight. These may be used to calculate your BMI (body mass index). BMI is a measurement that tells if you are at a healthy weight.  · Heart rate and blood pressure.  · Body temperature.  · Skin for abnormal spots.  Counseling  Your health care provider may ask you questions about your:  · Past medical problems.  · Family's medical history.  · Alcohol, tobacco, and drug use.  · Emotional well-being.  · Home life and relationship well-being.  · Sexual activity.  · Diet, exercise, and sleep habits.  · Work and work environment.  · Access to firearms.  · Method of birth control.  · Menstrual cycle.  · Pregnancy history.  What immunizations do I need?    Vaccines are usually given at various ages, according to a schedule. Your health care provider will recommend vaccines for you based on your age, medical history, and lifestyle or other factors, such as travel or where you work.  What tests do I need?    Blood tests  · Lipid and cholesterol levels. These may be checked every 5 years starting at age 20.  · Hepatitis C test.  · Hepatitis B test.  Screening  · Diabetes screening. This is done by checking your blood sugar (glucose) after you have not eaten for a while (fasting).  · STD (sexually transmitted disease) testing, if you are at risk.  · BRCA-related cancer screening. This may be  done if you have a family history of breast, ovarian, tubal, or peritoneal cancers.  · Pelvic exam and Pap test. This may be done every 3 years starting at age 21. Starting at age 30, this may be done every 5 years if you have a Pap test in combination with an HPV test.  Talk with your health care provider about your test results, treatment options, and if necessary, the need for more tests.  Follow these instructions at home:  Eating and drinking    · Eat a healthy diet that includes fresh fruits and vegetables, whole grains, lean protein, and low-fat dairy products.  · Take vitamin and mineral supplements as recommended by your health care provider.  · Do not drink alcohol if:  ? Your health care provider tells you not to drink.  ? You are pregnant, may be pregnant, or are planning to become pregnant.  · If you drink alcohol:  ? Limit how much you have to 0-1 drink a day.  ? Be aware of how much alcohol is in your drink. In the [place] one drink equals one 12 oz bottle of beer (355 mL), one 5 oz glass of wine (148 mL), or one 1½ oz glass of hard liquor (44 mL).    Lifestyle  · Take daily care of your teeth and gums. Brush your teeth every morning and night with fluoride toothpaste. Floss one time each day.  · Stay active. Exercise for at least 30 minutes 5 or more days each week.  · Do not use any products that contain nicotine or tobacco, such as cigarettes, e-cigarettes, and chewing tobacco. If you need help quitting, ask your health care provider.  · Do not use drugs.  · If you are sexually active, practice safe sex. Use a condom or other form of birth control (contraception) in order to prevent pregnancy and STIs (sexually transmitted infections). If you plan to become pregnant, see your health care provider for a pre-conception visit.  · Find healthy ways to cope with stress, such as:  ? Meditation, yoga, or listening to music.  ? Journaling.  ? Talking to a trusted person.  ? Spending time with friends and  family.  Safety  · Always wear your seat belt while driving or riding in a vehicle.  · Do not drive:  ? If you have been drinking alcohol. Do not ride with someone who has been drinking.  ? When you are tired or distracted.  ? While texting.  · Wear a helmet and other protective equipment during sports activities.  · If you have firearms in your house, make sure you follow all gun safety procedures.  · Seek help if you have been physically or sexually abused.  What's next?  · Go to your health care provider once a year for an annual wellness visit.  · Ask your health care provider how often you should have your eyes and teeth checked.  · Stay up to date on all vaccines.  This information is not intended to replace advice given to you by your health care provider. Make sure you discuss any questions you have with your health care provider.  Document Revised: 09/02/2020 Document Reviewed: 08/29/2019  [employer] Patient Education © 2021 [employer]

## 2021-11-10 LAB
ALBUMIN SERPL-MCNC: 4.8 G/DL (ref 3.8–4.8)
ALBUMIN/GLOB SERPL: 1.9 {RATIO} (ref 1.2–2.2)
ALP SERPL-CCNC: 65 IU/L (ref 44–121)
ALT SERPL-CCNC: 16 IU/L (ref 0–32)
AST SERPL-CCNC: 16 IU/L (ref 0–40)
BILIRUB SERPL-MCNC: 0.3 MG/DL (ref 0–1.2)
BUN SERPL-MCNC: 12 MG/DL (ref 6–20)
BUN/CREAT SERPL: 15 (ref 9–23)
CALCIUM SERPL-MCNC: 9.7 MG/DL (ref 8.7–10.2)
CHLORIDE SERPL-SCNC: 102 MMOL/L (ref 96–106)
CHOLEST SERPL-MCNC: 191 MG/DL (ref 100–199)
CO2 SERPL-SCNC: 24 MMOL/L (ref 20–29)
CREAT SERPL-MCNC: 0.8 MG/DL (ref 0.57–1)
ERYTHROCYTE [DISTWIDTH] IN BLOOD BY AUTOMATED COUNT: 12.3 % (ref 11.7–15.4)
GLOBULIN SER CALC-MCNC: 2.5 G/DL (ref 1.5–4.5)
GLUCOSE SERPL-MCNC: 78 MG/DL (ref 65–99)
HCT VFR BLD AUTO: 43.6 % (ref 34–46.6)
HDLC SERPL-MCNC: 58 MG/DL
HGB BLD-MCNC: 14.5 G/DL (ref 11.1–15.9)
LDLC SERPL CALC-MCNC: 77 MG/DL (ref 0–99)
LDLC/HDLC SERPL: 1.3 RATIO (ref 0–3.2)
MCH RBC QN AUTO: 31.6 PG (ref 26.6–33)
MCHC RBC AUTO-ENTMCNC: 33.3 G/DL (ref 31.5–35.7)
MCV RBC AUTO: 95 FL (ref 79–97)
PLATELET # BLD AUTO: 334 X10E3/UL (ref 150–450)
POTASSIUM SERPL-SCNC: 4 MMOL/L (ref 3.5–5.2)
PROT SERPL-MCNC: 7.3 G/DL (ref 6–8.5)
RBC # BLD AUTO: 4.59 X10E6/UL (ref 3.77–5.28)
SODIUM SERPL-SCNC: 141 MMOL/L (ref 134–144)
TRIGL SERPL-MCNC: 352 MG/DL (ref 0–149)
VLDLC SERPL CALC-MCNC: 56 MG/DL (ref 5–40)
WBC # BLD AUTO: 8.6 X10E3/UL (ref 3.4–10.8)

## 2021-11-30 ENCOUNTER — OFFICE VISIT (OUTPATIENT)
Dept: CARDIOLOGY | Facility: CLINIC | Age: 36
End: 2021-11-30

## 2021-11-30 VITALS
SYSTOLIC BLOOD PRESSURE: 124 MMHG | OXYGEN SATURATION: 99 % | DIASTOLIC BLOOD PRESSURE: 82 MMHG | HEART RATE: 66 BPM | WEIGHT: 215.6 LBS | HEIGHT: 67 IN | BODY MASS INDEX: 33.84 KG/M2

## 2021-11-30 DIAGNOSIS — Z72.0 TOBACCO USE: ICD-10-CM

## 2021-11-30 DIAGNOSIS — O13.3 PREGNANCY-INDUCED HYPERTENSION IN THIRD TRIMESTER: ICD-10-CM

## 2021-11-30 DIAGNOSIS — R00.2 PALPITATIONS: Primary | ICD-10-CM

## 2021-11-30 PROCEDURE — 99204 OFFICE O/P NEW MOD 45 MIN: CPT | Performed by: INTERNAL MEDICINE

## 2021-11-30 PROCEDURE — 93000 ELECTROCARDIOGRAM COMPLETE: CPT | Performed by: INTERNAL MEDICINE

## 2021-11-30 NOTE — PROGRESS NOTES
PATIENTINFORMATION    Date of Office Visit: 2021  Encounter Provider: Kingston Tao MD  Place of Service: [facility] CARDIOLOGY  Patient Name: Olga Szymanski  : 1985    Subjective:     Encounter Date:2021      Patient ID: Olga Szymanski is a 36 y.o. female.    Chief Complaint   Patient presents with   • Chest Pain   • Palpitations     HPI  Ms. Szymanski a pleasant 36 years old female patient with past medical history of general anxiety disorder, allergies and asthma, gestational hypertension referred to cardiology clinic for evaluation of palpitations.  She was diagnosed with pregnancy induced hypertension during her first pregnancy this year that started during last  trimester and persisted 1 month postpartum.  Currently not on any antihypertensive medications.  She has had palpitations for few months now that seems have worsened in the past 1 month.  She describes episodes as heart beating fast and lasting for several minutes to hours and happens only at nighttime.  No associated symptoms and no known exacerbating or relieving factors.  She works at [employer] reasonably active and exertional shortness of breath or chest pain.  Her father has atrial fibrillation but she denies any family history of premature coronary artery disease.    She smokes about 1 pack per week.    She was recently started on sertraline and has helped her with sleep and also with palpitations.      ROS   All systems reviewed are negative except as noted.    Past Medical History:   Diagnosis Date   • Abnormal Pap smear of cervix     HPV   • Allergic    • Asthma     Has not needed an Albuterol inhaler for about 2 yrs.   • Gestational hypertension     B/P elevated x 1 wk   • HPV in female     followed by Gynecology       Past Surgical History:   Procedure Laterality Date   • RHINOPLASTY         Social History     Socioeconomic History   • Marital status:      Spouse name:  "Rafal    Tobacco Use   • Smoking status: Current Some Day Smoker     Types: Cigarettes   • Smokeless tobacco: Never Used   • Tobacco comment: socially/caffeine use    Vaping Use   • Vaping Use: Never used   • Passive vaping exposure: Yes   Substance and Sexual Activity   • Alcohol use: Not Currently     Comment: prior to pregnancy    • Drug use: No   • Sexual activity: Yes     Partners: Male     Birth control/protection: None     Comment: trying to conceive       Family History   Problem Relation Age of Onset   • Colon polyps Father    • Anxiety disorder Brother    • Depression Brother    • Ovarian cancer Maternal Grandmother    • Breast cancer Maternal Grandmother    • Heart disease Maternal Grandfather    • Breast cancer Paternal Grandmother    • Colon cancer Paternal Grandmother    • Alzheimer's disease Paternal Grandfather            ECG 12 Lead    Date/Time: 11/30/2021 7:43 AM  Performed by: Kingston Tao MD  Authorized by: Kingston Tao MD   Comparison: compared with previous ECG   Rhythm: sinus rhythm  Rate: normal  Conduction: conduction normal  ST Segments: ST segments normal  T Waves: T waves normal  QRS axis: normal  Other: no other findings    Clinical impression: normal ECG               Objective:     /82 (BP Location: Left arm, Patient Position: Sitting)   Pulse 66   Ht 170.2 cm (67\")   Wt 97.8 kg (215 lb 9.6 oz)   SpO2 99%   BMI 33.77 kg/m²  Body mass index is 33.77 kg/m².     Constitutional:       General: Not in acute distress.     Appearance: Well-developed. Not diaphoretic.   Eyes:      Pupils: Pupils are equal, round, and reactive to light.   HENT:      Head: Normocephalic and atraumatic.   Neck:      Thyroid: No thyromegaly.   Pulmonary:      Effort: Pulmonary effort is normal. No respiratory distress.      Breath sounds: Normal breath sounds. No wheezing. No rales.   Chest:      Chest wall: Not tender to palpatation.   Cardiovascular:      Normal rate. Regular " rhythm.      No gallop.   Pulses:     Intact distal pulses.   Edema:     Peripheral edema absent.   Abdominal:      General: Bowel sounds are normal. There is no distension.      Palpations: Abdomen is soft.      Tenderness: There is no guarding.   Musculoskeletal: Normal range of motion.         General: No deformity.      Cervical back: Normal range of motion and neck supple. Skin:     General: Skin is warm and dry.      Findings: No rash.   Neurological:      Mental Status: Alert and oriented to person, place, and time.      Cranial Nerves: No cranial nerve deficit.      Deep Tendon Reflexes: Reflexes are normal and symmetric.   Psychiatric:         Judgment: Judgment normal.         Review Of Data: I have reviewed recent      Assessment/Plan:       1.  Palpitations- normal ekg today   2.  Generalized anxiety disorder  3.  Pregnancy-induced hypertension  4.  Tobacco use  5.  Obesity     Palpitations almost every night without any history of symptoms.  I will send her on a 48-hour Holter monitor.   She is planning on quitting tobacco  Blood pressure within range.  She will.  He can monitor blood pressure at home.  She will  increase level of activity and exercise regularly    Generalized anxiety disorder        Diagnosis and plan of care discussed with patient and verbalized understanding.           Kingston Tao MD  11/30/21  08:47 EST

## 2021-12-13 ENCOUNTER — PATIENT ROUNDING (BHMG ONLY) (OUTPATIENT)
Dept: CARDIOLOGY | Facility: CLINIC | Age: 36
End: 2021-12-13

## 2021-12-13 NOTE — PROGRESS NOTES
December 13, 2021    Hello, may I speak with Olga Szymanski?    My name is Hillary    I am  with [facility] CARDIOLOGY  [address]  [place] [address] [phone].    Before we get started may I verify your date of birth? 1985    I am calling to officially welcome you to our practice and ask about your recent visit. Is this a good time to talk? yes    Tell me about your visit with us. What things went well?  Everything was good and Holter monitor Tech also very nice and friendly. Just waiting on a call for results of Holter Monitor. I let Ms. Szymanski know that I would send a message for her to get a call when the results were in.       We're always looking for ways to make our patients' experiences even better. Do you have recommendations on ways we may improve?  no    Overall were you satisfied with your first visit to our practice? yes       I appreciate you taking the time to speak with me today. Is there anything else I can do for you? no      Thank you, and have a great day.

## 2021-12-21 ENCOUNTER — TELEPHONE (OUTPATIENT)
Dept: CARDIOLOGY | Facility: CLINIC | Age: 36
End: 2021-12-21

## 2021-12-21 NOTE — TELEPHONE ENCOUNTER
GUIDO---  Do we have monitor report back on patient?  If so can you please give copy of report to an MD to read?

## 2021-12-21 NOTE — TELEPHONE ENCOUNTER
Sallie,   Following up on a message from rakesh.  Do we have Holter results on this pt?  Thanks  Maye Singer RN  Triage nurse

## 2021-12-21 NOTE — TELEPHONE ENCOUNTER
----- Message from Hillary De La Cruz sent at 12/13/2021  6:34 PM EST -----  Regarding: Holter Results  In doing a follow up patient experience call this evening Ms. Szymanski said her visit was great and complimented everyone and the Holter tech for how nice they were to her. She also mentioned she had not gotten a call with her results yet. And she was concerned she may have not filled out the paperwork correctly to receive a call. I assured her that she was ok with her paperwork and it was correct. I explained that Holter results can sometimes take a week or so and she was understanding of this. And I told her I would relay the message for her so she would recive a call when her results were available.

## 2021-12-21 NOTE — TELEPHONE ENCOUNTER
Thanks for the update.      Maye---  if patient still having palpitations then yes, would look at doing another Holter.  Please let her know that there was a technical issue with the first 1 unfortunately.  If palpitations have resolved then will send message to Dr. Tao to decide whether he still wants this done.  If she does do repeat Holter then please have her let us know if she has not heard anything within 2 weeks of mailing this back.

## 2022-01-03 ENCOUNTER — TELEPHONE (OUTPATIENT)
Dept: CARDIOLOGY | Facility: CLINIC | Age: 37
End: 2022-01-03

## 2022-01-03 NOTE — TELEPHONE ENCOUNTER
Pt.. was seen by Dr. Tao 11/30/21 a holter monitor was applied.  Pt. Failed to return the monitor.  Messages have been left, however she does return the calls x2  KIP Agustin

## 2022-01-18 RX ORDER — SERTRALINE HYDROCHLORIDE 25 MG/1
TABLET, FILM COATED ORAL
Qty: 60 TABLET | Refills: 5 | Status: SHIPPED | OUTPATIENT
Start: 2022-01-18 | End: 2022-02-01 | Stop reason: DRUGHIGH

## 2022-02-01 ENCOUNTER — TELEPHONE (OUTPATIENT)
Dept: FAMILY MEDICINE CLINIC | Facility: CLINIC | Age: 37
End: 2022-02-01

## 2022-04-11 ENCOUNTER — OFFICE VISIT (OUTPATIENT)
Dept: FAMILY MEDICINE CLINIC | Facility: CLINIC | Age: 37
End: 2022-04-11

## 2022-04-11 VITALS
OXYGEN SATURATION: 99 % | BODY MASS INDEX: 30.76 KG/M2 | RESPIRATION RATE: 14 BRPM | HEIGHT: 67 IN | WEIGHT: 196 LBS | DIASTOLIC BLOOD PRESSURE: 96 MMHG | HEART RATE: 72 BPM | SYSTOLIC BLOOD PRESSURE: 142 MMHG

## 2022-04-11 DIAGNOSIS — M25.561 ACUTE PAIN OF RIGHT KNEE: Primary | ICD-10-CM

## 2022-04-11 PROCEDURE — 99213 OFFICE O/P EST LOW 20 MIN: CPT | Performed by: NURSE PRACTITIONER

## 2022-04-11 NOTE — PROGRESS NOTES
Subjective   Olga Szymanski is a 36 y.o. female.   right knee pain after a fall (Never got any images done)    History of Present Illness   Fell directly on her right knee a couple of months ago.She has intermittent pain. She used ice and ibuprofen when she first did it.    The following portions of the patient's history were reviewed and updated as appropriate: allergies, current medications, past family history, past medical history, past social history, past surgical history and problem list.    Review of Systems   Constitutional: Positive for activity change.   Musculoskeletal: Negative for joint swelling.       Objective   Physical Exam  Vitals and nursing note reviewed.   Constitutional:       Appearance: She is well-developed.   HENT:      Head: Normocephalic and atraumatic.   Eyes:      Pupils: Pupils are equal, round, and reactive to light.   Pulmonary:      Effort: Pulmonary effort is normal.   Musculoskeletal:         General: Tenderness present. Normal range of motion.      Comments: She has pain and tenderness to her right middle knee with extension,and palpation   Skin:     General: Skin is warm and dry.   Neurological:      Mental Status: She is alert and oriented to person, place, and time.           Assessment/Plan   Problem List Items Addressed This Visit    None     Visit Diagnoses     Acute pain of right knee    -  Primary    Relevant Orders    Ambulatory Referral to Orthopedic Surgery        Referral to Russ!       Return if symptoms worsen or fail to improve.

## 2022-04-14 ENCOUNTER — OFFICE VISIT (OUTPATIENT)
Dept: ORTHOPEDIC SURGERY | Facility: CLINIC | Age: 37
End: 2022-04-14

## 2022-04-14 VITALS — HEIGHT: 66 IN | BODY MASS INDEX: 31.18 KG/M2 | WEIGHT: 194 LBS | TEMPERATURE: 96.9 F

## 2022-04-14 DIAGNOSIS — M25.461 EFFUSION OF RIGHT KNEE: ICD-10-CM

## 2022-04-14 DIAGNOSIS — M25.561 MECHANICAL PAIN OF RIGHT KNEE: ICD-10-CM

## 2022-04-14 DIAGNOSIS — M25.561 RIGHT KNEE PAIN, UNSPECIFIED CHRONICITY: Primary | ICD-10-CM

## 2022-04-14 PROCEDURE — 99214 OFFICE O/P EST MOD 30 MIN: CPT | Performed by: NURSE PRACTITIONER

## 2022-04-14 PROCEDURE — 73564 X-RAY EXAM KNEE 4 OR MORE: CPT | Performed by: NURSE PRACTITIONER

## 2022-04-14 NOTE — PROGRESS NOTES
"Patient Name: Olga Szymanski   YOB: 1985  Referring Primary Care Physician: Azra Moraes, APRN  BMI: Body mass index is 31.31 kg/m².    Chief Complaint:    Chief Complaint   Patient presents with   • Right Knee - Pain        HPI: Fall a few months ago and twisted knee and is having pain and describes knee as \"unstable.\" Pt has locking and catching. Pt has refrained from activity, used advil for 2 weeks and did ice and has not improved.  She is very active and reports locking catching in mechanical instability at this point was sent by her primary care physician.    Olga Szymanski is a 36 y.o. female who presents today for evaluation of   Chief Complaint   Patient presents with   • Right Knee - Pain         Subjective   Medications:   Home Medications:  Current Outpatient Medications on File Prior to Visit   Medication Sig   • Doxylamine Succinate, Sleep, (SLEEP AID PO) Take  by mouth.   • sertraline (Zoloft) 50 MG tablet Take 1 tablet by mouth Daily.     No current facility-administered medications on file prior to visit.     Current Medications:  Scheduled Meds:  Continuous Infusions:No current facility-administered medications for this visit.    PRN Meds:.    I have reviewed the patient's medical history in detail and updated the computerized patient record.  Review and summarization of old records includes:    Past Medical History:   Diagnosis Date   • Abnormal Pap smear of cervix     HPV   • Allergic    • Asthma     Has not needed an Albuterol inhaler for about 2 yrs.   • Gestational hypertension     B/P elevated x 1 wk   • HPV in female 2006    followed by Gynecology        Past Surgical History:   Procedure Laterality Date   • RHINOPLASTY  2002        Social History     Occupational History   • Occupation:     Tobacco Use   • Smoking status: Current Some Day Smoker     Types: Cigarettes   • Smokeless tobacco: Never Used   • Tobacco comment: socially/caffeine use  "   Vaping Use   • Vaping Use: Never used   • Passive vaping exposure: Yes   Substance and Sexual Activity   • Alcohol use: Not Currently     Comment: prior to pregnancy    • Drug use: No   • Sexual activity: Yes     Partners: Male     Birth control/protection: None     Comment: trying to conceive      Social History     Social History Narrative    Lives at home with her  and their dog.  Works as a  at [employer].          Family History   Problem Relation Age of Onset   • Colon polyps Father    • Anxiety disorder Brother    • Depression Brother    • Ovarian cancer Maternal Grandmother    • Breast cancer Maternal Grandmother    • Heart disease Maternal Grandfather    • Breast cancer Paternal Grandmother    • Colon cancer Paternal Grandmother    • Alzheimer's disease Paternal Grandfather        ROS: 14 point review of systems was performed and all other systems were reviewed and are negative except for documented findings in HPI and today's encounter.     Allergies: No Known Allergies  Constitutional:  Denies fever, shaking or chills   Eyes:  Denies change in visual acuity   HENT:  Denies nasal congestion or sore throat   Respiratory:  Denies cough or shortness of breath   Cardiovascular:  Denies chest pain or severe LE edema   GI:  Denies abdominal pain, nausea, vomiting, bloody stools or diarrhea   Musculoskeletal:  Numbness, tingling, pain, or loss of motor function only as noted above in history of present illness.  : Denies painful urination or hematuria  Integument:  Denies rash, lesion or ulceration   Neurologic:  Denies headache or focal weakness  Endocrine:  Denies lymphadenopathy  Psych:  Denies confusion or change in mental status   Hem:  Denies active bleeding    OBJECTIVE:  Physical Exam: 36 y.o. female  Wt Readings from Last 3 Encounters:   04/14/22 88 kg (194 lb)   04/11/22 88.9 kg (196 lb)   11/30/21 97.8 kg (215 lb 9.6 oz)     Ht Readings from Last 1 Encounters:   04/14/22 167.6 cm  "(66\")     Body mass index is 31.31 kg/m².  Vitals:    04/14/22 1526   Temp: 96.9 °F (36.1 °C)     Vital signs reviewed.     General Appearance:    Alert, cooperative, in no acute distress                  Eyes: conjunctiva clear  ENT: external ears and nose atraumatic  CV: no peripheral edema  Resp: normal respiratory effort  Skin: no rashes or wounds; normal turgor  Psych: mood and affect appropriate  Lymph: no nodes appreciated  Neuro: gross sensation intact  Vascular:  Palpable peripheral pulse in noted extremity  Musculoskeletal Extremities: Skin is warm dry and intact with good pulses mood sensation calves are soft and nontender right knee has positive Cyndi's ligamentous exam was not assessed she has chondromalacia of the patella hip is nontender ankles nontender    Radiology:   Right knee 4 views were done with no comparison for pain show narrowing of the patellofemoral and some medial joint line changes        Assessment:     ICD-10-CM ICD-9-CM   1. Right knee pain, unspecified chronicity  M25.561 719.46   2. Mechanical pain of right knee  M25.561 719.46        MDM/Plan:   The diagnosis(es), natural history, pathophysiology and treatment for diagnosis(es) were discussed. Opportunity given and questions answered.  Biomechanics of pertinent body areas discussed.  When appropriate, the use of ambulatory aids discussed.    The diagnosis(es), natural history, pathophysiology and treatment for diagnosis(es) were discussed. Opportunity given and questions answered.  Biomechanics of pertinent body areas discussed.  When appropriate, the use of ambulatory aids discussed.  EXERCISES:  Advice on benefits of, and types of regular/moderate exercise pertaining to orthopedic diagnosis(es).  MEDICATIONS:  The risks, benefits, warnings,side effects and alternatives of medications discussed.  Inflammation/pain control; with cold, heat, elevation and/or liniments discussed as appropriate  HOME EXERCISE/PT program " encouraged  MRI.  MEDICAL RECORDS reviewed from other provider(s) for past and current medical history pertinent to this complaint.      4/21/2022    Dictated utilizing Dragon dictation

## 2022-05-02 ENCOUNTER — TELEPHONE (OUTPATIENT)
Dept: ORTHOPEDIC SURGERY | Facility: CLINIC | Age: 37
End: 2022-05-02

## 2022-05-02 DIAGNOSIS — M25.461 EFFUSION OF RIGHT KNEE: ICD-10-CM

## 2022-05-02 DIAGNOSIS — M25.561 MECHANICAL PAIN OF RIGHT KNEE: ICD-10-CM

## 2022-05-02 DIAGNOSIS — M25.561 RIGHT KNEE PAIN, UNSPECIFIED CHRONICITY: Primary | ICD-10-CM

## 2022-05-02 NOTE — TELEPHONE ENCOUNTER
----- Message from JOEL Wallace sent at 5/2/2022  1:11 PM EDT -----  Mri knee - no fracture or internal derangement - follow up sports med cim  ----- Message -----  From: Tabitha Damon Incoming  Sent: 5/2/2022  10:36 AM EDT  To: JOEL Wallace

## 2022-05-02 NOTE — TELEPHONE ENCOUNTER
Pt informed of results and recommendations; referral was placed for sports med and she is aware someone from that office will call her to set up an appt.

## 2022-05-17 ENCOUNTER — OFFICE VISIT (OUTPATIENT)
Dept: SPORTS MEDICINE | Facility: CLINIC | Age: 37
End: 2022-05-17

## 2022-05-17 VITALS
HEIGHT: 66 IN | WEIGHT: 194 LBS | OXYGEN SATURATION: 99 % | HEART RATE: 74 BPM | SYSTOLIC BLOOD PRESSURE: 124 MMHG | RESPIRATION RATE: 16 BRPM | DIASTOLIC BLOOD PRESSURE: 80 MMHG | BODY MASS INDEX: 31.18 KG/M2 | TEMPERATURE: 98.2 F

## 2022-05-17 DIAGNOSIS — M25.561 ACUTE PAIN OF RIGHT KNEE: ICD-10-CM

## 2022-05-17 DIAGNOSIS — M76.50 PATELLAR TENDINOSIS: Primary | ICD-10-CM

## 2022-05-17 PROCEDURE — 99204 OFFICE O/P NEW MOD 45 MIN: CPT | Performed by: FAMILY MEDICINE

## 2022-05-17 RX ORDER — NITROGLYCERIN 0.1MG/HR
PATCH, TRANSDERMAL 24 HOURS TRANSDERMAL
Qty: 30 PATCH | Refills: 0 | Status: SHIPPED | OUTPATIENT
Start: 2022-05-17 | End: 2022-09-22

## 2022-05-17 NOTE — PROGRESS NOTES
"Olga is a 36 y.o. year old female presents to [facility]    Chief Complaint   Patient presents with   • Knee Pain     Referral from JOEL Wallace for eval of RT knee pain - x-rays done on 04/14/2022 - reports having a fall a few months ago and twisted her knee - has catching at times, no other sxs reported - reports having MRI at [facility] 04/29/2022 - here for further evaluation and treatment        History of Present Illness  Ms. Szymanski is a 36-year-old female presenting with right knee pain since a fall in mid-February where she fell directly on concrete making contact with the right knee cap.   Sharp pains along the anterior portio of the knee below the patella. Wakes up with throbbing pain at night. But she will also go long periods of time without any pain. Historically she is a runner average 3-4 miles, three to four times weekly. But in recent years she transitioned to boot camp workouts with box jumps and other activities that load the knees.     I have reviewed the patient's medical, family, and social history in detail and updated the computerized patient record.    /80 (BP Location: Left arm, Patient Position: Sitting, Cuff Size: Adult)   Pulse 74   Temp 98.2 °F (36.8 °C) (Temporal)   Resp 16   Ht 167.6 cm (65.98\")   Wt 88 kg (194 lb)   SpO2 99%   Breastfeeding No   BMI 31.33 kg/m²      Physical Exam    Mask worn thru encounter  Vital signs reviewed.   General: No acute distress.  Eyes: conjunctiva clear; pupils equally round and reactive  ENT: external ears atraumatic  CV: no peripheral edema  Resp: normal respiratory effort, no use of accessory muscles  Skin: no rashes or wounds; normal turgor  Psych: mood and affect appropriate; recent and remote memory intact  Neuro: sensation to light touch intact    MSK Exam  Right Knee:  No effusion  Full ROM  Lachman negative  Medial and lateral Cyndi negative  Tenderness along the proximal pole of the " patella tendon  Negative patellar apprehension test    SCANNED - IMAGING (04/14/2022)  Patella tendinopathy, enthesopathic changes.         Diagnoses and all orders for this visit:    Patellar tendinosis  -     nitroglycerin (NITRODUR) 0.1 MG/HR patch; Use as directed to apply to right knee for 12 hrs then remove    Acute pain of right knee      Plan:  Begin a course of physical therapy, nitroglycerin patch, and patella tendon strap to address the right patellar tendinosis. Consider RTO for discussion of other options I.e. PRP.    Therapeutic exercises were reviewed with the patient by  Lizet Lima in the office.  This included provision of a handout, detailed exercises including demonstration and provision of bands as necessary.  These were performed for the purpose of developing strength, optimizing range of motion, attempting to optimize healing of this condition.  A minimum of 15 minutes was spent for this education.      Follow Up   Return if symptoms worsen or fail to improve.  Patient was given instructions and counseling regarding her condition or for health maintenance advice. Please see specific information pulled into the AVS if appropriate.     EMR Dragon/Transcription disclaimer:    Much of this encounter note is an electronic transcription/translation of spoken language to printed text.  The electronic translation of spoken language may permit erroneous, or at times, nonsensical words or phrases to be inadvertently transcribed.  Although I have reviewed the note for such errors some may still exist.

## 2022-11-07 ENCOUNTER — OFFICE VISIT (OUTPATIENT)
Dept: FAMILY MEDICINE CLINIC | Facility: CLINIC | Age: 37
End: 2022-11-07

## 2022-11-07 VITALS
HEIGHT: 67 IN | RESPIRATION RATE: 14 BRPM | HEART RATE: 87 BPM | DIASTOLIC BLOOD PRESSURE: 88 MMHG | WEIGHT: 190 LBS | OXYGEN SATURATION: 98 % | SYSTOLIC BLOOD PRESSURE: 124 MMHG | BODY MASS INDEX: 29.82 KG/M2

## 2022-11-07 DIAGNOSIS — Z13.228 SCREENING FOR METABOLIC DISORDER: ICD-10-CM

## 2022-11-07 DIAGNOSIS — Z13.220 SCREENING, LIPID: ICD-10-CM

## 2022-11-07 DIAGNOSIS — R10.9 LEFT FLANK PAIN, CHRONIC: ICD-10-CM

## 2022-11-07 DIAGNOSIS — Z00.00 PREVENTATIVE HEALTH CARE: Primary | ICD-10-CM

## 2022-11-07 DIAGNOSIS — G89.29 LEFT FLANK PAIN, CHRONIC: ICD-10-CM

## 2022-11-07 LAB
B-HCG UR QL: NEGATIVE
BILIRUB BLD-MCNC: NEGATIVE MG/DL
CLARITY, POC: CLEAR
COLOR UR: ABNORMAL
EXPIRATION DATE: NORMAL
GLUCOSE UR STRIP-MCNC: NEGATIVE MG/DL
INTERNAL NEGATIVE CONTROL: NORMAL
INTERNAL POSITIVE CONTROL: NORMAL
KETONES UR QL: ABNORMAL
LEUKOCYTE EST, POC: NEGATIVE
Lab: NORMAL
NITRITE UR-MCNC: NEGATIVE MG/ML
PH UR: 7 [PH] (ref 5–8)
PROT UR STRIP-MCNC: ABNORMAL MG/DL
RBC # UR STRIP: NEGATIVE /UL
SP GR UR: 1 (ref 1–1.03)
UROBILINOGEN UR QL: NORMAL

## 2022-11-07 PROCEDURE — 81025 URINE PREGNANCY TEST: CPT | Performed by: NURSE PRACTITIONER

## 2022-11-07 PROCEDURE — 81002 URINALYSIS NONAUTO W/O SCOPE: CPT | Performed by: NURSE PRACTITIONER

## 2022-11-07 PROCEDURE — 90686 IIV4 VACC NO PRSV 0.5 ML IM: CPT | Performed by: NURSE PRACTITIONER

## 2022-11-07 PROCEDURE — 99213 OFFICE O/P EST LOW 20 MIN: CPT | Performed by: NURSE PRACTITIONER

## 2022-11-07 PROCEDURE — 90471 IMMUNIZATION ADMIN: CPT | Performed by: NURSE PRACTITIONER

## 2022-11-07 PROCEDURE — 99395 PREV VISIT EST AGE 18-39: CPT | Performed by: NURSE PRACTITIONER

## 2022-11-07 NOTE — PROGRESS NOTES
Subjective   Olga Szymanski is a 36 y.o. female.     History of Present Illness   Estbalished patient, new to this provider.    Here for annual exam, labs, updated vaccines. She has additional left flank pain.    Patient's only acute concern today is left flank/rib pain on and off.  This has been present for months.  Patient thought maybe this was the way she was sleeping she always sleeps on left side.  No improvement with mattress change.  No fever, no urinary symptoms, has had renal stone in the past. No acute nausea or vomiting or abdominal pain today.  She did have 1 bout of nausea and vomiting yesterday.    Patient reports she has history of palpitations that come and go.  She has had normal EKG in the past.  No symptoms today.    Patient has some chronic GERD and had 1 episode of nausea and vomiting yesterday.  She does not take medication for GERD.  No nausea or vomiting today.    Patient reports some chronic depression and anxiety that waxes and wanes.  She has never been medicated for this.  She denies SI/HI. PHQ-9 Total Score: 0    The following portions of the patient's history were reviewed and updated as appropriate: allergies, current medications, past family history, past medical history, past social history, past surgical history and problem list.    Review of Systems   Constitutional: Negative for activity change, diaphoresis, fatigue, fever, unexpected weight gain and unexpected weight loss.   HENT: Positive for postnasal drip. Negative for congestion.         Dental exam is due     Eyes: Negative for blurred vision and double vision.        No glasses or contacts    Respiratory: Negative for cough, chest tightness, shortness of breath and wheezing.    Cardiovascular: Positive for palpitations (occasional racing heart ). Negative for chest pain and leg swelling.   Gastrointestinal: Positive for nausea, vomiting and GERD. Negative for abdominal pain, blood in stool, constipation and diarrhea.    Endocrine: Negative for cold intolerance, heat intolerance, polydipsia, polyphagia and polyuria.   Genitourinary: Negative for dysuria, frequency and hematuria.   Musculoskeletal: Negative for arthralgias.   Skin: Negative for rash.   Allergic/Immunologic: Positive for environmental allergies.   Neurological: Positive for headache. Negative for dizziness, seizures and syncope.   Hematological: Does not bruise/bleed easily.   Psychiatric/Behavioral: Positive for sleep disturbance and depressed mood. The patient is nervous/anxious.        Objective   Physical Exam  Vitals reviewed.   Constitutional:       Appearance: Normal appearance. She is normal weight.   HENT:      Head: Normocephalic.      Right Ear: Tympanic membrane normal.      Left Ear: Tympanic membrane normal.      Nose: Nose normal.      Mouth/Throat:      Mouth: Mucous membranes are moist.   Eyes:      Pupils: Pupils are equal, round, and reactive to light.   Cardiovascular:      Rate and Rhythm: Normal rate and regular rhythm.      Pulses: Normal pulses.      Heart sounds: Normal heart sounds.   Pulmonary:      Effort: Pulmonary effort is normal.      Breath sounds: Normal breath sounds.   Abdominal:      General: Abdomen is flat. Bowel sounds are normal.      Palpations: Abdomen is soft. There is shifting dullness. There is no hepatomegaly, splenomegaly or mass.      Tenderness: There is abdominal tenderness. There is no right CVA tenderness, left CVA tenderness, guarding or rebound. Negative signs include Valles's sign, McBurney's sign, psoas sign and obturator sign.   Musculoskeletal:         General: Normal range of motion.      Cervical back: Normal range of motion.   Skin:     General: Skin is warm.   Neurological:      General: No focal deficit present.      Mental Status: She is alert.   Psychiatric:         Mood and Affect: Mood normal.         Vitals:    11/07/22 0915   BP: 124/88   Pulse: 87   Resp: 14   SpO2: 98%     Body mass index is  29.76 kg/m².    Procedures    Assessment & Plan   Problems Addressed this Visit    None  Visit Diagnoses     Preventative health care    -  Primary    Screening, lipid        Relevant Orders    Lipid Panel With / Chol / HDL Ratio    Screening for metabolic disorder        Relevant Orders    Comprehensive Metabolic Panel    CBC & Differential    Left flank pain, chronic        Relevant Orders    XR Abdomen KUB    CBC & Differential      Diagnoses       Codes Comments    Preventative health care    -  Primary ICD-10-CM: Z00.00  ICD-9-CM: V70.0     Screening, lipid     ICD-10-CM: Z13.220  ICD-9-CM: V77.91     Screening for metabolic disorder     ICD-10-CM: Z13.228  ICD-9-CM: V77.99     Left flank pain, chronic     ICD-10-CM: R10.9, G89.29  ICD-9-CM: 789.09, 338.29         Orders Placed This Encounter   Procedures   • XR Abdomen KUB     Standing Status:   Future     Standing Expiration Date:   11/7/2023     Scheduling Instructions:      L flank tenderness      H.o renal stone     Order Specific Question:   Reason for Exam:     Answer:   Flank pain, r/o kidney stone     Order Specific Question:   Patient Pregnant     Answer:   No     Order Specific Question:   Release to patient     Answer:   Routine Release   • FluLaval/Fluzone >6 mos ([id number])   • Comprehensive Metabolic Panel     Order Specific Question:   Release to patient     Answer:   Routine Release   • Lipid Panel With / Chol / HDL Ratio     Order Specific Question:   Release to patient     Answer:   Routine Release   • POCT pregnancy, urine     Order Specific Question:   Release to patient     Answer:   Routine Release   • POC Urinalysis Dipstick     Order Specific Question:   Release to patient     Answer:   Routine Release   • CBC & Differential       Preventative care- Follow heart healthy diet, drink water, walk daily. Wear seatbelts, wear helmets, wear sunscreens. Follow CDC guidelines for covid pandemic.     Left flank pain-check urine, check pregnancy  test, order KUB x-ray, drink plenty of fluids, Tylenol or ibuprofen for pain, position of comfort for sleep May use ice or heat and topical rubs  ER if blood in urine or inability to pass urine           Additional time on acute concern and flank pain today greater than 21 minutes  Education provided in AVS   Return in about 1 year (around 11/7/2023) for Annual.

## 2022-11-08 LAB
ALBUMIN SERPL-MCNC: 4.4 G/DL (ref 3.5–5.2)
ALBUMIN/GLOB SERPL: 1.6 G/DL
ALP SERPL-CCNC: 49 U/L (ref 39–117)
ALT SERPL-CCNC: 16 U/L (ref 1–33)
AST SERPL-CCNC: 19 U/L (ref 1–32)
BASOPHILS # BLD AUTO: 0.01 10*3/MM3 (ref 0–0.2)
BASOPHILS NFR BLD AUTO: 0.2 % (ref 0–1.5)
BILIRUB SERPL-MCNC: 0.8 MG/DL (ref 0–1.2)
BUN SERPL-MCNC: 10 MG/DL (ref 6–20)
BUN/CREAT SERPL: 12.5 (ref 7–25)
CALCIUM SERPL-MCNC: 9.3 MG/DL (ref 8.6–10.5)
CHLORIDE SERPL-SCNC: 101 MMOL/L (ref 98–107)
CHOLEST SERPL-MCNC: 173 MG/DL (ref 0–200)
CHOLEST/HDLC SERPL: 2.75 {RATIO}
CO2 SERPL-SCNC: 30.3 MMOL/L (ref 22–29)
CREAT SERPL-MCNC: 0.8 MG/DL (ref 0.57–1)
EGFRCR SERPLBLD CKD-EPI 2021: 96.9 ML/MIN/1.73
EOSINOPHIL # BLD AUTO: 0.03 10*3/MM3 (ref 0–0.4)
EOSINOPHIL NFR BLD AUTO: 0.5 % (ref 0.3–6.2)
ERYTHROCYTE [DISTWIDTH] IN BLOOD BY AUTOMATED COUNT: 12.2 % (ref 12.3–15.4)
GLOBULIN SER CALC-MCNC: 2.8 GM/DL
GLUCOSE SERPL-MCNC: 88 MG/DL (ref 65–99)
HCT VFR BLD AUTO: 42.3 % (ref 34–46.6)
HDLC SERPL-MCNC: 63 MG/DL (ref 40–60)
HGB BLD-MCNC: 14.6 G/DL (ref 12–15.9)
IMM GRANULOCYTES # BLD AUTO: 0.01 10*3/MM3 (ref 0–0.05)
IMM GRANULOCYTES NFR BLD AUTO: 0.2 % (ref 0–0.5)
LDLC SERPL CALC-MCNC: 72 MG/DL (ref 0–100)
LYMPHOCYTES # BLD AUTO: 1.02 10*3/MM3 (ref 0.7–3.1)
LYMPHOCYTES NFR BLD AUTO: 18.6 % (ref 19.6–45.3)
MCH RBC QN AUTO: 32.6 PG (ref 26.6–33)
MCHC RBC AUTO-ENTMCNC: 34.5 G/DL (ref 31.5–35.7)
MCV RBC AUTO: 94.4 FL (ref 79–97)
MONOCYTES # BLD AUTO: 0.36 10*3/MM3 (ref 0.1–0.9)
MONOCYTES NFR BLD AUTO: 6.6 % (ref 5–12)
NEUTROPHILS # BLD AUTO: 4.06 10*3/MM3 (ref 1.7–7)
NEUTROPHILS NFR BLD AUTO: 73.9 % (ref 42.7–76)
NRBC BLD AUTO-RTO: 0 /100 WBC (ref 0–0.2)
PLATELET # BLD AUTO: 281 10*3/MM3 (ref 140–450)
POTASSIUM SERPL-SCNC: 4.2 MMOL/L (ref 3.5–5.2)
PROT SERPL-MCNC: 7.2 G/DL (ref 6–8.5)
RBC # BLD AUTO: 4.48 10*6/MM3 (ref 3.77–5.28)
SODIUM SERPL-SCNC: 141 MMOL/L (ref 136–145)
TRIGL SERPL-MCNC: 235 MG/DL (ref 0–150)
VLDLC SERPL CALC-MCNC: 38 MG/DL (ref 5–40)
WBC # BLD AUTO: 5.49 10*3/MM3 (ref 3.4–10.8)

## 2022-11-09 ENCOUNTER — HOSPITAL ENCOUNTER (OUTPATIENT)
Dept: GENERAL RADIOLOGY | Facility: HOSPITAL | Age: 37
Discharge: HOME | End: 2022-11-09
Admitting: NURSE PRACTITIONER

## 2022-11-09 DIAGNOSIS — R10.9 LEFT FLANK PAIN, CHRONIC: ICD-10-CM

## 2022-11-09 DIAGNOSIS — G89.29 LEFT FLANK PAIN, CHRONIC: ICD-10-CM

## 2022-11-09 PROCEDURE — 74018 RADEX ABDOMEN 1 VIEW: CPT

## 2022-12-02 ENCOUNTER — TELEPHONE (OUTPATIENT)
Dept: FAMILY MEDICINE CLINIC | Facility: CLINIC | Age: 37
End: 2022-12-02

## 2022-12-02 NOTE — TELEPHONE ENCOUNTER
Caller: Olga Szymanski    Relationship: Self    Best call back number: [phone]     What orders are you requesting (i.e. lab or imaging): MRI / XRAY    In what timeframe would the patient need to come in: AS SOON AS AVAILABLE    Where will you receive your lab/imaging services: ANY    Additional notes: UNDIAGNOSED PAIN IN LEFT SIDE

## 2022-12-05 DIAGNOSIS — R10.12 LUQ ABDOMINAL PAIN: Primary | ICD-10-CM

## 2022-12-13 ENCOUNTER — HOSPITAL ENCOUNTER (OUTPATIENT)
Dept: ULTRASOUND IMAGING | Facility: HOSPITAL | Age: 37
Discharge: HOME | End: 2022-12-13
Admitting: NURSE PRACTITIONER

## 2022-12-13 DIAGNOSIS — R10.12 LUQ ABDOMINAL PAIN: ICD-10-CM

## 2022-12-13 PROCEDURE — 76700 US EXAM ABDOM COMPLETE: CPT

## 2022-12-16 DIAGNOSIS — R10.9: Primary | ICD-10-CM

## 2022-12-23 ENCOUNTER — OFFICE VISIT (OUTPATIENT)
Dept: GASTROENTEROLOGY | Facility: CLINIC | Age: 37
End: 2022-12-23
Payer: COMMERCIAL

## 2022-12-23 VITALS — WEIGHT: 186 LBS | HEIGHT: 67 IN | BODY MASS INDEX: 29.19 KG/M2

## 2022-12-23 DIAGNOSIS — K21.9 GASTROESOPHAGEAL REFLUX DISEASE, UNSPECIFIED WHETHER ESOPHAGITIS PRESENT: ICD-10-CM

## 2022-12-23 DIAGNOSIS — R10.9 LEFT SIDED ABDOMINAL PAIN: Primary | ICD-10-CM

## 2022-12-23 DIAGNOSIS — Z80.0 FH: COLON CANCER: ICD-10-CM

## 2022-12-23 DIAGNOSIS — Z83.71 FH: COLON POLYPS: ICD-10-CM

## 2022-12-23 DIAGNOSIS — R14.0 ABDOMINAL BLOATING: ICD-10-CM

## 2022-12-23 PROCEDURE — 99443 PR PHYS/QHP TELEPHONE EVALUATION 21-30 MIN: CPT | Performed by: NURSE PRACTITIONER

## 2022-12-23 RX ORDER — SACCHAROMYCES BOULARDII 250 MG
250 CAPSULE ORAL 2 TIMES DAILY
COMMUNITY
End: 2023-01-17

## 2022-12-23 NOTE — Clinical Note
Please call the patient and have her schedule EGD and colonoscopy with Dr. Ornelas.  She also needs office follow-up with me in 4 weeks.  Thanks

## 2023-01-05 NOTE — PROGRESS NOTES
Chief Complaint   Patient presents with   • Abdominal Pain   • Madhavi Szymanski is a 37 y.o. female who presents with abdominal pain.    HPI  37-year-old female presents today as a new patient to our clinic.  She will be establishing with myself and Dr. Ornelas.You have chosen to receive care through a telephone visit. Do you consent to use a telephone visit for your medical care today? YES.    She tells me off and on for the last several weeks she has had worsening left-sided abdominal pain with gas and bloating.  She tells me her reflux has been worse.  She takes Pepcid OTC as needed.  It does help when she takes it.  She has never had an EGD or screening colonoscopy before.  She does have a GI family history with her dad with colon polyps and her paternal grandmother had colon cancer.  She tells me her abdominal pain is worse at night.  She tells me it feels like muscle spasm sometimes.  She had an abdominal ultrasound done on 12/13/2022 which was normal.  She does have reflux from time to time but denies any nausea, vomiting, dysphagia, melena or rectal bleeding.  She admits her appetite is okay and her weight is down 4 pounds since November of this year.  Does report her bowels move well.  She tells me nothing really makes the abdominal pain better.      Past Medical History:   Diagnosis Date   • Abnormal Pap smear of cervix     HPV   • Allergic    • Asthma     Has not needed an Albuterol inhaler for about 2 yrs.   • Gestational hypertension     B/P elevated x 1 wk   • HPV in female 2006    followed by Gynecology       Past Surgical History:   Procedure Laterality Date   • RHINOPLASTY  2002         Current Outpatient Medications:   •  prenatal vitamin (prenatal, CLASSIC, vitamin) tablet, Take  by mouth Daily., Disp: , Rfl:   •  saccharomyces boulardii (FLORASTOR) 250 MG capsule, Take 250 mg by mouth 2 (Two) Times a Day., Disp: , Rfl:   •  Ubiquinol 200 MG capsule, Take  by mouth., Disp: , Rfl:      No Known Allergies    Social History     Socioeconomic History   • Marital status:      Spouse name: Rafal    • Number of children: 1   Tobacco Use   • Smoking status: Some Days     Packs/day: 0.00     Years: 15.00     Pack years: 0.00     Types: Cigarettes     Start date: 1/1/2000     Passive exposure: Past   • Smokeless tobacco: Never   • Tobacco comments:     socially/caffeine use    Vaping Use   • Vaping Use: Never used   • Passive vaping exposure: Yes   Substance and Sexual Activity   • Alcohol use: Yes     Alcohol/week: 10.0 standard drinks     Types: 5 Glasses of wine, 5 Drinks containing 0.5 oz of alcohol per week     Comment: prior to pregnancy    • Drug use: Yes     Frequency: 3.0 times per week     Types: Marijuana   • Sexual activity: Yes     Partners: Male     Birth control/protection: None     Comment: trying to conceive       Family History   Problem Relation Age of Onset   • Colon polyps Father    • Anxiety disorder Brother    • Depression Brother    • Ovarian cancer Maternal Grandmother    • Breast cancer Maternal Grandmother    • Heart disease Maternal Grandfather    • Breast cancer Paternal Grandmother    • Colon cancer Paternal Grandmother    • Alzheimer's disease Paternal Grandfather        Review of Systems   Constitutional: Negative for appetite change, chills, diaphoresis, fatigue, fever and unexpected weight change.   HENT: Negative for nosebleeds, postnasal drip, sore throat, trouble swallowing and voice change.    Respiratory: Negative for cough, choking, chest tightness, shortness of breath, wheezing and stridor.    Cardiovascular: Negative for chest pain, palpitations and leg swelling.   Gastrointestinal: Positive for abdominal distention and abdominal pain. Negative for anal bleeding, blood in stool, constipation, diarrhea, nausea, rectal pain and vomiting.   Endocrine: Negative for polydipsia, polyphagia and polyuria.   Musculoskeletal: Negative for gait problem.   Skin:  Negative for rash and wound.   Allergic/Immunologic: Negative for food allergies.   Neurological: Negative for dizziness, speech difficulty and light-headedness.   Psychiatric/Behavioral: Negative for confusion, self-injury, sleep disturbance and suicidal ideas.       There were no vitals filed for this visit.    Physical Exam  Constitutional:       General: She is not in acute distress.  Pulmonary:      Effort: Pulmonary effort is normal.   Neurological:      Mental Status: She is oriented to person, place, and time.   Psychiatric:         Mood and Affect: Mood normal.         Behavior: Behavior normal.         Thought Content: Thought content normal.         No radiology results for the last 7 days       Assessment and plan    1. Left sided abdominal pain  - Case Request; Standing  - Case Request    2. Abdominal bloating  - Case Request; Standing  - Case Request    3. Gastroesophageal reflux disease, unspecified whether esophagitis present  - Case Request; Standing  - Case Request    4. FH: colon cancer  - Case Request; Standing  - Case Request    5. FH: colon polyps  - Case Request; Standing  - Case Request      Today's visit was done over the telephone.  Total time on the phone with the patient was 30 minutes.  Reviewed most recent labs and imaging results with her today.  Abdominal ultrasound done on 12/13/2022 was negative.  Given her history and current symptoms recommend EGD and colonoscopy for further evaluation.  Patient is agreeable to the scopes.  For now would like her to start taking Pepcid Complete more routinely.  Continue GERD precautions.  Patient to call the office next week with an update.  Patient to follow-up with me in 3 to 4 weeks.  Patient is agreeable to the plan.

## 2023-01-09 ENCOUNTER — TELEPHONE (OUTPATIENT)
Dept: GASTROENTEROLOGY | Facility: CLINIC | Age: 38
End: 2023-01-09
Payer: COMMERCIAL

## 2023-01-17 ENCOUNTER — OFFICE VISIT (OUTPATIENT)
Dept: GASTROENTEROLOGY | Facility: CLINIC | Age: 38
End: 2023-01-17
Payer: COMMERCIAL

## 2023-01-17 VITALS
DIASTOLIC BLOOD PRESSURE: 85 MMHG | HEART RATE: 79 BPM | SYSTOLIC BLOOD PRESSURE: 123 MMHG | BODY MASS INDEX: 31.23 KG/M2 | HEIGHT: 67 IN | TEMPERATURE: 97.1 F | WEIGHT: 199 LBS

## 2023-01-17 DIAGNOSIS — R14.0 ABDOMINAL BLOATING: ICD-10-CM

## 2023-01-17 DIAGNOSIS — Z80.0 FH: COLON CANCER: ICD-10-CM

## 2023-01-17 DIAGNOSIS — R10.9 LEFT SIDED ABDOMINAL PAIN: ICD-10-CM

## 2023-01-17 DIAGNOSIS — K21.9 GASTROESOPHAGEAL REFLUX DISEASE, UNSPECIFIED WHETHER ESOPHAGITIS PRESENT: Primary | ICD-10-CM

## 2023-01-17 DIAGNOSIS — Z83.71 FH: COLON POLYPS: ICD-10-CM

## 2023-01-17 PROCEDURE — 99214 OFFICE O/P EST MOD 30 MIN: CPT | Performed by: NURSE PRACTITIONER

## 2023-01-17 NOTE — PROGRESS NOTES
Chief Complaint   Patient presents with   • Heartburn   • Abdominal Pain   • Bloated       Olga Szymanski is a  37 y.o. female here for a follow up visit for GERD.    HPI  37-year-old female presents today for follow-up visit for GERD and abdominal pain.  She is a patient of Dr. Ornelas.  She was last seen on telehealth visit by me on 12/23/2022.  She has a history of GERD and admits she is doing better on Pepcid OTC as needed.  She does me she does not need it every day but when her symptoms creep up the Pepcid really does seem to help.  She still is having some issues with bloating and abdominal pain.  The pain seems to be worse on the left side when it acts up.  She does report her bowels are moving well currently.  She is never had an EGD or screening colonoscopy before.  She does have a family history of colon cancer and a family history of colon polyps.  She denies any dysphagia, nausea and vomiting, diarrhea, rectal bleeding or melena.  She admits her appetite is good and her weight is up 13 pounds since last visit.  Past Medical History:   Diagnosis Date   • Abnormal Pap smear of cervix     HPV   • Allergic    • Asthma     Has not needed an Albuterol inhaler for about 2 yrs.   • Gestational hypertension     B/P elevated x 1 wk   • HPV in female 2006    followed by Gynecology       Past Surgical History:   Procedure Laterality Date   • RHINOPLASTY  2002       Scheduled Meds:    Continuous Infusions:No current facility-administered medications for this visit.      PRN Meds:.    No Known Allergies    Social History     Socioeconomic History   • Marital status:      Spouse name: Rafal    • Number of children: 1   Tobacco Use   • Smoking status: Some Days     Packs/day: 0.00     Years: 15.00     Pack years: 0.00     Types: Cigarettes     Start date: 1/1/2000     Passive exposure: Past   • Smokeless tobacco: Never   • Tobacco comments:     socially/caffeine use    Vaping Use   • Vaping Use: Never  used   • Passive vaping exposure: Yes   Substance and Sexual Activity   • Alcohol use: Yes     Alcohol/week: 10.0 standard drinks     Types: 5 Glasses of wine, 5 Drinks containing 0.5 oz of alcohol per week     Comment: prior to pregnancy    • Drug use: Yes     Frequency: 3.0 times per week     Types: Marijuana   • Sexual activity: Yes     Partners: Male     Birth control/protection: None     Comment: trying to conceive       Family History   Problem Relation Age of Onset   • Colon polyps Father    • Anxiety disorder Brother    • Depression Brother    • Ovarian cancer Maternal Grandmother    • Breast cancer Maternal Grandmother    • Heart disease Maternal Grandfather    • Breast cancer Paternal Grandmother    • Colon cancer Paternal Grandmother    • Alzheimer's disease Paternal Grandfather        Review of Systems   Constitutional: Negative for appetite change, chills, diaphoresis, fatigue, fever and unexpected weight change.   HENT: Negative for nosebleeds, postnasal drip, sore throat, trouble swallowing and voice change.    Respiratory: Negative for cough, choking, chest tightness, shortness of breath, wheezing and stridor.    Cardiovascular: Negative for chest pain, palpitations and leg swelling.   Gastrointestinal: Positive for abdominal distention and abdominal pain. Negative for anal bleeding, blood in stool, constipation, diarrhea, nausea, rectal pain and vomiting.   Endocrine: Negative for polydipsia, polyphagia and polyuria.   Musculoskeletal: Negative for gait problem.   Skin: Negative for rash and wound.   Allergic/Immunologic: Negative for food allergies.   Neurological: Negative for dizziness, speech difficulty and light-headedness.   Psychiatric/Behavioral: Negative for confusion, self-injury, sleep disturbance and suicidal ideas.       Vitals:    01/17/23 0827   BP: 123/85   Pulse: 79   Temp: 97.1 °F (36.2 °C)       Physical Exam  Constitutional:       General: She is not in acute distress.      Appearance: She is well-developed. She is not ill-appearing.   HENT:      Head: Normocephalic.   Eyes:      Pupils: Pupils are equal, round, and reactive to light.   Cardiovascular:      Rate and Rhythm: Normal rate and regular rhythm.      Heart sounds: Normal heart sounds.   Pulmonary:      Effort: Pulmonary effort is normal.      Breath sounds: Normal breath sounds.   Abdominal:      General: Bowel sounds are normal. There is distension.      Palpations: Abdomen is soft. There is no mass.      Tenderness: There is no abdominal tenderness. There is no guarding or rebound.      Hernia: No hernia is present.   Musculoskeletal:         General: Normal range of motion.   Skin:     General: Skin is warm and dry.   Neurological:      Mental Status: She is alert and oriented to person, place, and time.   Psychiatric:         Speech: Speech normal.         Behavior: Behavior normal.         Judgment: Judgment normal.         No radiology results for the last 7 days     Diagnoses and all orders for this visit:    1. Gastroesophageal reflux disease, unspecified whether esophagitis present (Primary)    2. Abdominal bloating    3. Left sided abdominal pain    4. FH: colon cancer    5. FH: colon polyps    GERD seems better on Pepcid OTC as needed.  Continue GERD precautions.  Bowels moving well currently.  Still having some issues with abdominal pain and bloating.  She needs to get on the scope schedule for an EGD and screening colonoscopy with Dr. Ornelas for further evaluation.  Patient is agreeable to the scopes.  She can do that today while she is here.  Patient to call the office with any issues.  Patient to follow-up with me after her scopes.  Patient is agreeable to the plan.

## 2023-01-30 ENCOUNTER — LAB REQUISITION (OUTPATIENT)
Dept: LAB | Facility: HOSPITAL | Age: 38
End: 2023-01-30
Payer: COMMERCIAL

## 2023-01-30 ENCOUNTER — OUTSIDE FACILITY SERVICE (OUTPATIENT)
Dept: GASTROENTEROLOGY | Facility: CLINIC | Age: 38
End: 2023-01-30
Payer: COMMERCIAL

## 2023-01-30 DIAGNOSIS — Z80.0 FAMILY HISTORY OF COLON CANCER REQUIRING SCREENING COLONOSCOPY: ICD-10-CM

## 2023-01-30 PROCEDURE — 43239 EGD BIOPSY SINGLE/MULTIPLE: CPT | Performed by: INTERNAL MEDICINE

## 2023-01-30 PROCEDURE — 45378 DIAGNOSTIC COLONOSCOPY: CPT | Performed by: INTERNAL MEDICINE

## 2023-01-30 PROCEDURE — 88305 TISSUE EXAM BY PATHOLOGIST: CPT | Performed by: INTERNAL MEDICINE

## 2023-01-30 PROCEDURE — 88312 SPECIAL STAINS GROUP 1: CPT | Performed by: INTERNAL MEDICINE

## 2023-02-01 LAB
LAB AP CASE REPORT: NORMAL
LAB AP DIAGNOSIS COMMENT: NORMAL
PATH REPORT.ADDENDUM SPEC: NORMAL
PATH REPORT.FINAL DX SPEC: NORMAL
PATH REPORT.GROSS SPEC: NORMAL

## 2023-02-06 ENCOUNTER — TELEPHONE (OUTPATIENT)
Dept: GASTROENTEROLOGY | Facility: CLINIC | Age: 38
End: 2023-02-06
Payer: COMMERCIAL

## 2023-02-06 DIAGNOSIS — R14.0 ABDOMINAL BLOATING: Primary | ICD-10-CM

## 2023-02-06 RX ORDER — PANTOPRAZOLE SODIUM 40 MG/1
40 TABLET, DELAYED RELEASE ORAL DAILY
Qty: 30 TABLET | Refills: 6 | Status: SHIPPED | OUTPATIENT
Start: 2023-02-06

## 2023-02-06 NOTE — TELEPHONE ENCOUNTER
Patient notified of results and recommendations and verbalized understanding  Lab order placed and lab apt scheduled.  Rx for pantoprazole placed.

## 2023-02-06 NOTE — TELEPHONE ENCOUNTER
----- Message from Bryce VELASCO MD sent at 2/5/2023  2:21 PM EST -----  Regarding: Biopsy results  Okay to call results, recommend testing for celiac sprue if not already done, would also initiate a proton pump inhibitor.  Can follow-up annually but call sooner as needed.  ----- Message -----  From: Lab, Background User  Sent: 1/31/2023   1:02 PM EST  To: Bryce VELASCO MD

## 2023-02-20 ENCOUNTER — TELEPHONE (OUTPATIENT)
Dept: GASTROENTEROLOGY | Facility: CLINIC | Age: 38
End: 2023-02-20

## 2023-02-20 NOTE — TELEPHONE ENCOUNTER
Pt had called to cancel and reschedule her lab that was scheduled for today 2/20.  I returned her call but had to leave a .

## 2023-02-20 NOTE — TELEPHONE ENCOUNTER
Hub staff attempted to follow warm transfer process and was unsuccessful     Caller: Olga Szymanski    Relationship to patient: Self    Best call back number: [phone]    Patient is needing: TO RESCHEDULE HER LABS FOR TODAY. SHE HAD A WORK CONFLICT COME UP.     PLEASE CALL PATIENT TO RESCHEDULE.

## 2023-09-28 ENCOUNTER — OFFICE VISIT (OUTPATIENT)
Dept: FAMILY MEDICINE CLINIC | Facility: CLINIC | Age: 38
End: 2023-09-28
Payer: COMMERCIAL

## 2023-09-28 VITALS
BODY MASS INDEX: 30.61 KG/M2 | SYSTOLIC BLOOD PRESSURE: 124 MMHG | WEIGHT: 195 LBS | DIASTOLIC BLOOD PRESSURE: 88 MMHG | HEIGHT: 67 IN | RESPIRATION RATE: 18 BRPM | HEART RATE: 88 BPM | OXYGEN SATURATION: 99 %

## 2023-09-28 DIAGNOSIS — F41.9 ANXIETY AND DEPRESSION: ICD-10-CM

## 2023-09-28 DIAGNOSIS — F43.0 PANIC ATTACK AS REACTION TO STRESS: ICD-10-CM

## 2023-09-28 DIAGNOSIS — R03.0 ELEVATED BP WITHOUT DIAGNOSIS OF HYPERTENSION: Primary | ICD-10-CM

## 2023-09-28 DIAGNOSIS — F41.0 PANIC ATTACK AS REACTION TO STRESS: ICD-10-CM

## 2023-09-28 DIAGNOSIS — F32.A ANXIETY AND DEPRESSION: ICD-10-CM

## 2023-09-28 PROCEDURE — 99214 OFFICE O/P EST MOD 30 MIN: CPT | Performed by: NURSE PRACTITIONER

## 2023-09-28 RX ORDER — HYDROXYZINE HYDROCHLORIDE 25 MG/1
25 TABLET, FILM COATED ORAL 3 TIMES DAILY PRN
Qty: 90 TABLET | Refills: 0 | Status: SHIPPED | OUTPATIENT
Start: 2023-09-28 | End: 2023-12-27

## 2023-09-28 RX ORDER — BUPROPION HYDROCHLORIDE 300 MG/1
300 TABLET ORAL DAILY
COMMUNITY

## 2023-09-28 NOTE — PATIENT INSTRUCTIONS
GOAL BP is < 130/80   LOW salt, walk daily, limit caffeine , sugar and stress  Prioritize sleep    Melatonin up to 10 mg   Magnesium glycinate 500 mg nightly  Limit screens 2 hr before bed  Dark, cold environment

## 2023-09-28 NOTE — PROGRESS NOTES
Subjective   Olga Szymanski is a 37 y.o. female.     Hypertension    Estab pt here for elevated blood pressure. Yesterday at work she drank a new sweet soda and this elevated her BP 140s/90s She had some ear ringing. Thought this was also panic attack. No chest pain or pressure.     Chronic anxiety and depression. She was seen at [facility] and started wellbutrin 150 mg x 2 weeks and increased to 300 mg. Mood is feeling better, but this week more anxious. Denies Si/HI. Poor sleep. PHQ-2 Depression Screening  Little interest or pleasure in doing things? 0-->not at all   Feeling down, depressed, or hopeless? 0-->not at all   PHQ-2 Total Score 0       The following portions of the patient's history were reviewed and updated as appropriate: allergies, current medications, past family history, past medical history, past social history, past surgical history and problem list.    Review of Systems    Objective   Physical Exam  Vitals reviewed.   Constitutional:       Appearance: Normal appearance.   HENT:      Mouth/Throat:      Mouth: Mucous membranes are moist.   Cardiovascular:      Rate and Rhythm: Normal rate and regular rhythm.      Pulses: Normal pulses.      Heart sounds: Normal heart sounds.   Pulmonary:      Effort: Pulmonary effort is normal.      Breath sounds: Normal breath sounds.   Musculoskeletal:         General: Normal range of motion.   Skin:     General: Skin is warm.   Neurological:      General: No focal deficit present.      Mental Status: She is alert.   Psychiatric:         Mood and Affect: Mood is anxious and depressed.       Vitals:    09/28/23 1550   BP: 124/88   Pulse: 88   Resp: 18   SpO2: 99%     Body mass index is 30.54 kg/m².    Procedures    Assessment & Plan   Problems Addressed this Visit    None  Visit Diagnoses       Elevated BP without diagnosis of hypertension    -  Primary    Panic attack as reaction to stress        Anxiety and depression        Relevant Medications     buPROPion XL (Wellbutrin XL) 300 MG 24 hr tablet    hydrOXYzine (ATARAX) 25 MG tablet          Diagnoses         Codes Comments    Elevated BP without diagnosis of hypertension    -  Primary ICD-10-CM: R03.0  ICD-9-CM: 796.2     Panic attack as reaction to stress     ICD-10-CM: F41.0, F43.0  ICD-9-CM: 308.0     Anxiety and depression     ICD-10-CM: F41.9, F32.A  ICD-9-CM: 300.00, 311           Elevated BP= limit stimulants and caffeine/sweets/stress, drink water, walk daily, spot check BP. Bp today 124/88.    Panic/anxiety- cw wellbutrin, may consider lowering dose, enc therapy, add rx hydroxyzine 25 tid prn anxiety, reviewed med s/e profile, may cause sedation, do not drink alcohol or drive w meds, follow up in 4 weeks  ER or call [phone] for severe sx         Education provided in AVS   Return if symptoms worsen or fail to improve.

## 2023-11-15 ENCOUNTER — OFFICE VISIT (OUTPATIENT)
Dept: FAMILY MEDICINE CLINIC | Facility: CLINIC | Age: 38
End: 2023-11-15
Payer: COMMERCIAL

## 2023-11-15 VITALS
HEIGHT: 67 IN | DIASTOLIC BLOOD PRESSURE: 72 MMHG | HEART RATE: 86 BPM | OXYGEN SATURATION: 97 % | SYSTOLIC BLOOD PRESSURE: 118 MMHG | WEIGHT: 193 LBS | RESPIRATION RATE: 16 BRPM | BODY MASS INDEX: 30.29 KG/M2

## 2023-11-15 DIAGNOSIS — Z13.220 SCREENING, LIPID: ICD-10-CM

## 2023-11-15 DIAGNOSIS — Z13.228 ENCOUNTER FOR SCREENING FOR METABOLIC DISORDER: ICD-10-CM

## 2023-11-15 DIAGNOSIS — F41.9 ANXIETY: ICD-10-CM

## 2023-11-15 DIAGNOSIS — Z00.00 ENCOUNTER FOR PREVENTATIVE ADULT HEALTH CARE EXAMINATION: Primary | ICD-10-CM

## 2023-11-15 DIAGNOSIS — E66.09 CLASS 1 OBESITY DUE TO EXCESS CALORIES WITHOUT SERIOUS COMORBIDITY WITH BODY MASS INDEX (BMI) OF 30.0 TO 30.9 IN ADULT: ICD-10-CM

## 2023-11-15 PROBLEM — O13.3 PREGNANCY-INDUCED HYPERTENSION IN THIRD TRIMESTER: Status: RESOLVED | Noted: 2021-11-30 | Resolved: 2023-11-15

## 2023-11-15 PROBLEM — E66.811 CLASS 1 OBESITY DUE TO EXCESS CALORIES WITH BODY MASS INDEX (BMI) OF 30.0 TO 30.9 IN ADULT: Status: ACTIVE | Noted: 2023-11-15

## 2023-11-15 PROBLEM — Z34.90 PREGNANCY: Status: RESOLVED | Noted: 2021-01-11 | Resolved: 2023-11-15

## 2023-11-15 RX ORDER — MULTIPLE VITAMINS W/ MINERALS TAB 9MG-400MCG
1 TAB ORAL DAILY
COMMUNITY

## 2023-11-15 RX ORDER — SERTRALINE HYDROCHLORIDE 25 MG/1
25 TABLET, FILM COATED ORAL DAILY
Qty: 30 TABLET | Refills: 0 | Status: SHIPPED | OUTPATIENT
Start: 2023-11-15 | End: 2023-12-15

## 2023-11-15 NOTE — PROGRESS NOTES
Subjective   Olga Szymanski is a 37 y.o. female.     History of Present Illness   Estab pt here for annual exam, due labs and updated screening.     Acute on chronic anxiety and depression x years, worsening anxiety over the last year. She is on wellbutrin 300 qd and take hydroxyzine for breakthrough, This makes her too tired. She is using mostly at night.  Previously on sertraline for anxiety. She denies SI/HI.  Patient would like to add sertraline to her Wellbutrin and see if this helps with better coverage of her symptoms PHQ-2 Depression Screening  Little interest or pleasure in doing things? 0-->not at all   Feeling down, depressed, or hopeless? 1-->several days   PHQ-2 Total Score 1   She is seeing therapist once a week.     Occasional palpitations with anxiety. She has cut out caffeine. No chest pressure or pain.  BP and heart rate stable today.    The following portions of the patient's history were reviewed and updated as appropriate: allergies, current medications, past family history, past medical history, past social history, past surgical history and problem list.    Review of Systems   Constitutional:  Negative for activity change, diaphoresis, fatigue, unexpected weight gain and unexpected weight loss.   HENT:  Negative for congestion and postnasal drip.         Dental exam is due      Eyes:  Negative for blurred vision, double vision and visual disturbance.        Eye exam is utd      Respiratory:  Negative for cough, chest tightness, shortness of breath and wheezing.    Cardiovascular:  Positive for palpitations (occasional). Negative for chest pain and leg swelling.   Gastrointestinal:  Negative for abdominal pain, blood in stool, constipation, diarrhea and GERD.   Endocrine: Negative for cold intolerance, heat intolerance, polydipsia, polyphagia and polyuria.   Genitourinary:  Negative for breast lump, breast pain, dysuria, frequency and menstrual problem.   Musculoskeletal:  Positive for  arthralgias (some left sided aches and pains that come and go in back, side and lower leg, no numbness, weakness or worsening, better w yoga). Negative for back pain.   Skin:  Negative for rash.   Allergic/Immunologic: Negative for environmental allergies.   Neurological:  Negative for dizziness, weakness, numbness and headache.   Hematological:  Does not bruise/bleed easily.   Psychiatric/Behavioral:  Positive for depressed mood. Negative for sleep disturbance, suicidal ideas and stress. The patient is nervous/anxious.        Objective   Physical Exam  Vitals reviewed.   Constitutional:       Appearance: Normal appearance. She is normal weight.   HENT:      Head: Normocephalic.      Right Ear: Tympanic membrane normal.      Left Ear: Tympanic membrane normal.      Nose: Nose normal.      Mouth/Throat:      Mouth: Mucous membranes are moist.   Eyes:      Pupils: Pupils are equal, round, and reactive to light.   Cardiovascular:      Rate and Rhythm: Normal rate and regular rhythm.      Pulses: Normal pulses.      Heart sounds: Normal heart sounds.   Pulmonary:      Effort: Pulmonary effort is normal.      Breath sounds: Normal breath sounds.   Abdominal:      General: Abdomen is flat. Bowel sounds are normal.      Palpations: Abdomen is soft.   Musculoskeletal:         General: Normal range of motion.      Cervical back: Normal range of motion.   Skin:     General: Skin is warm.   Neurological:      General: No focal deficit present.      Mental Status: She is alert.   Psychiatric:         Mood and Affect: Mood normal.         Vitals:    11/15/23 0856   BP: 118/72   Pulse: 86   Resp: 16   SpO2: 97%     Body mass index is 30.23 kg/m².    Procedures    Assessment & Plan   Problems Addressed this Visit       Class 1 obesity due to excess calories with body mass index (BMI) of 30.0 to 30.9 in adult     Other Visit Diagnoses       Encounter for preventative adult health care examination    -  Primary    Screening, lipid         Relevant Orders    Lipid Panel With / Chol / HDL Ratio    Encounter for screening for metabolic disorder        Relevant Orders    Comprehensive Metabolic Panel    CBC & Differential    TSH Rfx On Abnormal To Free T4    Anxiety        Relevant Orders    TSH Rfx On Abnormal To Free T4          Diagnoses         Codes Comments    Encounter for preventative adult health care examination    -  Primary ICD-10-CM: Z00.00  ICD-9-CM: V70.0     Screening, lipid     ICD-10-CM: Z13.220  ICD-9-CM: V77.91     Encounter for screening for metabolic disorder     ICD-10-CM: Z13.228  ICD-9-CM: V77.99     Anxiety     ICD-10-CM: F41.9  ICD-9-CM: 300.00     Class 1 obesity due to excess calories without serious comorbidity with body mass index (BMI) of 30.0 to 30.9 in adult     ICD-10-CM: E66.09, Z68.30  ICD-9-CM: 278.00, V85.30           Orders Placed This Encounter   Procedures    Comprehensive Metabolic Panel     Order Specific Question:   Release to patient     Answer:   Routine Release [[id number]]    Lipid Panel With / Chol / HDL Ratio     Order Specific Question:   Release to patient     Answer:   Routine Release [[id number]]    TSH Rfx On Abnormal To Free T4     Order Specific Question:   Release to patient     Answer:   Routine Release [[id number]]    CBC & Differential     Order Specific Question:   Release to patient     Answer:   Routine Release [[id number]]       Preventative care- Follow heart healthy diet, drink water, walk daily. Wear seatbelts, wear helmets, wear sunscreens. Follow CDC guidelines for covid pandemic.     Anxiety-discussed with psychiatric APRN, will add new Rx low-dose sertraline 25 mg along with Wellbutrin.  Reviewed medication side effect profile.  ER for significant worsening or suicidality.  Call 8 suicide hotline for acute needs.  Encourage therapy, exercise, heart healthy diet, limit caffeine/stimulants/stress, hydrate with water, prioritize sleep  Call if palpitations or ER for severe  cardiac concerns    BMI is >= 30 and <35. (Class 1 Obesity). The following options were offered after discussion;: exercise counseling/recommendations and nutrition counseling/recommendations      Additional time on medication management, anxiety management>22 min  Education provided in AVS   Return in about 6 months (around 5/15/2024) for Recheck.

## 2023-11-16 LAB
ALBUMIN SERPL-MCNC: 5 G/DL (ref 3.5–5.2)
ALBUMIN/GLOB SERPL: 2 G/DL
ALP SERPL-CCNC: 47 U/L (ref 39–117)
ALT SERPL-CCNC: 22 U/L (ref 1–33)
AST SERPL-CCNC: 21 U/L (ref 1–32)
BASOPHILS # BLD AUTO: 0.05 10*3/MM3 (ref 0–0.2)
BASOPHILS NFR BLD AUTO: 0.7 % (ref 0–1.5)
BILIRUB SERPL-MCNC: 0.6 MG/DL (ref 0–1.2)
BUN SERPL-MCNC: 15 MG/DL (ref 6–20)
BUN/CREAT SERPL: 17.6 (ref 7–25)
CALCIUM SERPL-MCNC: 9.9 MG/DL (ref 8.6–10.5)
CHLORIDE SERPL-SCNC: 101 MMOL/L (ref 98–107)
CHOLEST SERPL-MCNC: 178 MG/DL (ref 0–200)
CHOLEST/HDLC SERPL: 2.34 {RATIO}
CO2 SERPL-SCNC: 27.2 MMOL/L (ref 22–29)
CREAT SERPL-MCNC: 0.85 MG/DL (ref 0.57–1)
EGFRCR SERPLBLD CKD-EPI 2021: 89.5 ML/MIN/1.73
EOSINOPHIL # BLD AUTO: 0.09 10*3/MM3 (ref 0–0.4)
EOSINOPHIL NFR BLD AUTO: 1.3 % (ref 0.3–6.2)
ERYTHROCYTE [DISTWIDTH] IN BLOOD BY AUTOMATED COUNT: 12.7 % (ref 12.3–15.4)
GLOBULIN SER CALC-MCNC: 2.5 GM/DL
GLUCOSE SERPL-MCNC: 79 MG/DL (ref 65–99)
HCT VFR BLD AUTO: 44.4 % (ref 34–46.6)
HDLC SERPL-MCNC: 76 MG/DL (ref 40–60)
HGB BLD-MCNC: 15.2 G/DL (ref 12–15.9)
IMM GRANULOCYTES # BLD AUTO: 0.03 10*3/MM3 (ref 0–0.05)
IMM GRANULOCYTES NFR BLD AUTO: 0.4 % (ref 0–0.5)
LDLC SERPL CALC-MCNC: 87 MG/DL (ref 0–100)
LYMPHOCYTES # BLD AUTO: 2.39 10*3/MM3 (ref 0.7–3.1)
LYMPHOCYTES NFR BLD AUTO: 33.9 % (ref 19.6–45.3)
MCH RBC QN AUTO: 33 PG (ref 26.6–33)
MCHC RBC AUTO-ENTMCNC: 34.2 G/DL (ref 31.5–35.7)
MCV RBC AUTO: 96.5 FL (ref 79–97)
MONOCYTES # BLD AUTO: 0.45 10*3/MM3 (ref 0.1–0.9)
MONOCYTES NFR BLD AUTO: 6.4 % (ref 5–12)
NEUTROPHILS # BLD AUTO: 4.04 10*3/MM3 (ref 1.7–7)
NEUTROPHILS NFR BLD AUTO: 57.3 % (ref 42.7–76)
NRBC BLD AUTO-RTO: 0 /100 WBC (ref 0–0.2)
PLATELET # BLD AUTO: 338 10*3/MM3 (ref 140–450)
POTASSIUM SERPL-SCNC: 4.7 MMOL/L (ref 3.5–5.2)
PROT SERPL-MCNC: 7.5 G/DL (ref 6–8.5)
RBC # BLD AUTO: 4.6 10*6/MM3 (ref 3.77–5.28)
SODIUM SERPL-SCNC: 138 MMOL/L (ref 136–145)
TRIGL SERPL-MCNC: 81 MG/DL (ref 0–150)
TSH SERPL DL<=0.005 MIU/L-ACNC: 1.72 UIU/ML (ref 0.27–4.2)
VLDLC SERPL CALC-MCNC: 15 MG/DL (ref 5–40)
WBC # BLD AUTO: 7.05 10*3/MM3 (ref 3.4–10.8)

## 2023-11-20 ENCOUNTER — FLU SHOT (OUTPATIENT)
Dept: FAMILY MEDICINE CLINIC | Facility: CLINIC | Age: 38
End: 2023-11-20
Payer: COMMERCIAL

## 2023-11-20 DIAGNOSIS — Z23 NEED FOR INFLUENZA VACCINATION: Primary | ICD-10-CM

## 2023-12-26 RX ORDER — SERTRALINE HYDROCHLORIDE 25 MG/1
25 TABLET, FILM COATED ORAL DAILY
Qty: 30 TABLET | Refills: 0 | Status: SHIPPED | OUTPATIENT
Start: 2023-12-26

## 2023-12-28 ENCOUNTER — TELEPHONE (OUTPATIENT)
Dept: FAMILY MEDICINE CLINIC | Facility: CLINIC | Age: 38
End: 2023-12-28
Payer: COMMERCIAL

## 2023-12-28 NOTE — TELEPHONE ENCOUNTER
Patient calling stating that she has had head congestion since 12/25 & has done numerous COVID test & they have been negative. Patient wants a Z-Pack called in if possible.

## 2024-01-23 RX ORDER — SERTRALINE HYDROCHLORIDE 25 MG/1
25 TABLET, FILM COATED ORAL DAILY
Qty: 30 TABLET | Refills: 0 | Status: SHIPPED | OUTPATIENT
Start: 2024-01-23

## 2024-02-06 RX ORDER — PANTOPRAZOLE SODIUM 40 MG/1
40 TABLET, DELAYED RELEASE ORAL DAILY
Qty: 30 TABLET | Refills: 6 | OUTPATIENT
Start: 2024-02-06

## 2024-02-26 RX ORDER — PANTOPRAZOLE SODIUM 40 MG/1
40 TABLET, DELAYED RELEASE ORAL DAILY
Qty: 30 TABLET | Refills: 6 | OUTPATIENT
Start: 2024-02-26

## 2024-02-26 NOTE — TELEPHONE ENCOUNTER
Message   Recorded as Task   Date: 04/18/2017 03:15 PM, Created By: Paris Chavez   Task Name: 1. Rx Request   Assigned To: Paris Chavez   Regarding Patient: JELLY MONTES, Status: Complete   Comment:    Paris Chavez - CST     Pt last seen 01/23/2023.

## 2024-02-29 RX ORDER — SERTRALINE HYDROCHLORIDE 25 MG/1
25 TABLET, FILM COATED ORAL DAILY
Qty: 30 TABLET | Refills: 0 | Status: SHIPPED | OUTPATIENT
Start: 2024-02-29

## 2024-04-08 RX ORDER — SERTRALINE HYDROCHLORIDE 25 MG/1
25 TABLET, FILM COATED ORAL DAILY
Qty: 30 TABLET | Refills: 0 | Status: SHIPPED | OUTPATIENT
Start: 2024-04-08

## 2024-05-09 RX ORDER — SERTRALINE HYDROCHLORIDE 25 MG/1
25 TABLET, FILM COATED ORAL DAILY
Qty: 30 TABLET | Refills: 0 | Status: SHIPPED | OUTPATIENT
Start: 2024-05-09

## 2024-06-05 RX ORDER — SERTRALINE HYDROCHLORIDE 25 MG/1
25 TABLET, FILM COATED ORAL DAILY
Qty: 30 TABLET | Refills: 0 | Status: SHIPPED | OUTPATIENT
Start: 2024-06-05

## 2024-07-26 RX ORDER — SERTRALINE HYDROCHLORIDE 25 MG/1
25 TABLET, FILM COATED ORAL DAILY
Qty: 30 TABLET | Refills: 0 | Status: SHIPPED | OUTPATIENT
Start: 2024-07-26

## 2024-09-03 RX ORDER — SERTRALINE HYDROCHLORIDE 25 MG/1
25 TABLET, FILM COATED ORAL DAILY
Qty: 30 TABLET | Refills: 0 | Status: SHIPPED | OUTPATIENT
Start: 2024-09-03

## 2024-10-02 RX ORDER — SERTRALINE HYDROCHLORIDE 25 MG/1
25 TABLET, FILM COATED ORAL DAILY
Qty: 30 TABLET | Refills: 0 | Status: SHIPPED | OUTPATIENT
Start: 2024-10-02

## 2024-10-15 ENCOUNTER — TELEPHONE (OUTPATIENT)
Dept: FAMILY MEDICINE CLINIC | Facility: CLINIC | Age: 39
End: 2024-10-15

## 2024-10-15 NOTE — TELEPHONE ENCOUNTER
Caller: Nessa Olga RENUKA     Relationship: [unfilled]     Best call back number: [phone]     What is your medical concern? PATIENT CALLING STATING THAT SHE WOKE UP THIS MORNING WITH HER LOWER LEFT HIP AND BACK HURTING SHE STATED THAT IT IS ALMOST DEBILITATING. SHE WOULD LIKE TO KNOW WHAT TATIANNA CORRALES THINK SHE SHOULD DO OR ANY RECOMMENDATIONS.    How long has this issue been going on? STARTED THIS MORNING     Is your provider already aware of this issue? YES    Have you been treated for this issue? NO

## 2025-01-31 ENCOUNTER — TELEPHONE (OUTPATIENT)
Dept: FAMILY MEDICINE CLINIC | Facility: CLINIC | Age: 40
End: 2025-01-31

## 2025-01-31 ENCOUNTER — OFFICE VISIT (OUTPATIENT)
Dept: FAMILY MEDICINE CLINIC | Facility: CLINIC | Age: 40
End: 2025-01-31
Payer: COMMERCIAL

## 2025-01-31 VITALS
HEIGHT: 67 IN | BODY MASS INDEX: 30.29 KG/M2 | HEART RATE: 90 BPM | RESPIRATION RATE: 18 BRPM | SYSTOLIC BLOOD PRESSURE: 122 MMHG | OXYGEN SATURATION: 99 % | WEIGHT: 193 LBS | DIASTOLIC BLOOD PRESSURE: 92 MMHG

## 2025-01-31 DIAGNOSIS — J02.8 PHARYNGITIS DUE TO OTHER ORGANISM: Primary | ICD-10-CM

## 2025-01-31 LAB
EXPIRATION DATE: NORMAL
HETEROPH AB SER QL LA: NEGATIVE
INTERNAL CONTROL: NORMAL
Lab: NORMAL

## 2025-01-31 PROCEDURE — 86308 HETEROPHILE ANTIBODY SCREEN: CPT | Performed by: NURSE PRACTITIONER

## 2025-01-31 PROCEDURE — 99213 OFFICE O/P EST LOW 20 MIN: CPT | Performed by: NURSE PRACTITIONER

## 2025-01-31 RX ORDER — PREDNISONE 20 MG/1
TABLET ORAL
Qty: 8 TABLET | Refills: 0 | Status: SHIPPED | OUTPATIENT
Start: 2025-01-31

## 2025-01-31 RX ORDER — AZITHROMYCIN 250 MG/1
TABLET, FILM COATED ORAL
Qty: 6 TABLET | Refills: 0 | Status: SHIPPED | OUTPATIENT
Start: 2025-01-31

## 2025-01-31 NOTE — TELEPHONE ENCOUNTER
"[facility] called regarding prednisone script that was sent in today by RIOS Quesada. Directions read \" Take two tablets by mouth for 5 days\" with a quantity of 8 tablets. Per RIOS Quesada, quantity is supposed to be 10.  "

## 2025-01-31 NOTE — PROGRESS NOTES
Subjective   Olga Szymanski is a 39 y.o. female.     Chief Complaint   Patient presents with    Sore Throat    Adenopathy     Pt states that her lymph nodes are swollen to the point where it hurts her throat to swallow. X2 weeks        History of Present Illness     History of Present Illness  The patient presents for evaluation of a sore throat.    She reports experiencing soreness in her lymph nodes and at the back of her tongue. A strep test was conducted, which yielded negative results. She was prescribed a steroid, which provided relief during its course, but the symptoms recurred upon discontinuation. She discontinued the steroid 5 days ago. She has been diagnosed with severe postnasal drip and has been using SAFI, although inconsistently due to recent travel. She has a history of allergies and has consulted an allergist for potential immunotherapy, but has not yet started the treatment. She experiences difficulty swallowing, particularly in the morning and at night, which disrupts her sleep. She is not on any other medications apart from SAFI. She has had strep throat a few times in the past, but this episode is more severe than previous ones. She recalls having a mild fever when the symptoms first appeared about 1.5 weeks ago. She also experienced temporary voice loss for 1.5 days and currently has a raspy voice.    MEDICATIONS  Current: SAFI     The following portions of the patient's history were reviewed and updated as appropriate: allergies, current medications, past family history, past medical history, past social history, past surgical history and problem list.    Review of Systems   Constitutional:  Negative for chills, fatigue and fever.   HENT:  Positive for sore throat and trouble swallowing. Negative for dental problem, drooling, ear discharge and nosebleeds.    Respiratory:  Negative for cough, chest tightness, shortness of breath and wheezing.    Cardiovascular:  Negative for chest pain,  palpitations and leg swelling.   Neurological:  Negative for dizziness and headache.   Hematological: Negative.    Psychiatric/Behavioral: Negative.  Negative for sleep disturbance.        Objective   Physical Exam  HENT:      Mouth/Throat:      Lips: Pink.      Mouth: Mucous membranes are moist.      Pharynx: Pharyngeal swelling, posterior oropharyngeal erythema and postnasal drip present.      Tonsils: 2+ on the right. 2+ on the left.      Comments: Cobblestone appearance to back of throat.        Vitals:    01/31/25 1449   BP: 122/92   Pulse: 90   Resp: 18   SpO2: 99%     Body mass index is 30.23 kg/m².      Procedures    Assessment & Plan   Problems Addressed this Visit    None  Visit Diagnoses       Pharyngitis due to other organism    -  Primary    Relevant Medications    azithromycin (Zithromax Z-Mikel) 250 MG tablet    predniSONE (DELTASONE) 20 MG tablet    Other Relevant Orders    Ambulatory Referral to ENT (Otolaryngology) (Completed)    POCT Infectious mononucleosis antibody          Diagnoses         Codes Comments    Pharyngitis due to other organism    -  Primary ICD-10-CM: J02.8  ICD-9-CM: 462             Assessment & Plan  1. Pharyngitis.  The patient's symptoms, including severe sore throat, postnasal drip, and cobblestone appearance of the throat, suggest pharyngitis. The differential diagnosis includes bacterial or viral etiology. Eustachian tube dysfunction can cause similar symptoms. Mononucleosis is also a consideration given the persistent symptoms. A prescription for azithromycin has been issued, with instructions to take two tablets on the first day, followed by one tablet daily for the subsequent days. A steroid has also been prescribed to manage inflammation. A swab test for mononucleosis will be conducted to rule out this potential cause. She is advised to continue using SAFI. If there is no improvement following the course of antibiotics and steroids, a referral to an ENT specialist will  be initiated.       Assessment & Plan  Pharyngitis due to other organism    Orders:    azithromycin (Zithromax Z-Mikel) 250 MG tablet; Take 2 tablets the first day, then 1 tablet daily for 4 days.    predniSONE (DELTASONE) 20 MG tablet; Take 2 tablets by mouth X 5 days    Ambulatory Referral to ENT (Otolaryngology)    POCT Infectious mononucleosis antibody           Return if symptoms worsen or fail to improve.    Patient or patient representative verbalized consent for the use of Ambient Listening during the visit with  JOEL Gardner for chart documentation. 1/31/2025  15:11 EST